# Patient Record
Sex: FEMALE | Race: WHITE | NOT HISPANIC OR LATINO | Employment: FULL TIME | URBAN - METROPOLITAN AREA
[De-identification: names, ages, dates, MRNs, and addresses within clinical notes are randomized per-mention and may not be internally consistent; named-entity substitution may affect disease eponyms.]

---

## 2021-09-11 ENCOUNTER — HOSPITAL ENCOUNTER (EMERGENCY)
Facility: HOSPITAL | Age: 26
Discharge: HOME | End: 2021-09-11
Attending: EMERGENCY MEDICINE | Admitting: EMERGENCY MEDICINE

## 2021-09-11 ENCOUNTER — APPOINTMENT (OUTPATIENT)
Dept: CT IMAGING | Facility: HOSPITAL | Age: 26
End: 2021-09-11

## 2021-09-11 VITALS
HEART RATE: 69 BPM | OXYGEN SATURATION: 98 % | RESPIRATION RATE: 16 BRPM | WEIGHT: 121.91 LBS | TEMPERATURE: 99.3 F | BODY MASS INDEX: 20.31 KG/M2 | HEIGHT: 65 IN | DIASTOLIC BLOOD PRESSURE: 62 MMHG | SYSTOLIC BLOOD PRESSURE: 107 MMHG

## 2021-09-11 DIAGNOSIS — N39.0 ACUTE UTI (URINARY TRACT INFECTION): ICD-10-CM

## 2021-09-11 DIAGNOSIS — S39.011A STRAIN OF ABDOMINAL MUSCLE, INITIAL ENCOUNTER: ICD-10-CM

## 2021-09-11 DIAGNOSIS — R10.30 LOWER ABDOMINAL PAIN: Primary | ICD-10-CM

## 2021-09-11 LAB
ALBUMIN SERPL-MCNC: 4 G/DL (ref 3.5–5.2)
ALBUMIN/GLOB SERPL: 1.2 G/DL
ALP SERPL-CCNC: 62 U/L (ref 39–117)
ALT SERPL W P-5'-P-CCNC: 7 U/L (ref 1–33)
ANION GAP SERPL CALCULATED.3IONS-SCNC: 8.1 MMOL/L (ref 5–15)
AST SERPL-CCNC: 13 U/L (ref 1–32)
BACTERIA UR QL AUTO: ABNORMAL /HPF
BASOPHILS # BLD AUTO: 0.04 10*3/MM3 (ref 0–0.2)
BASOPHILS NFR BLD AUTO: 0.5 % (ref 0–1.5)
BILIRUB SERPL-MCNC: 0.3 MG/DL (ref 0–1.2)
BILIRUB UR QL STRIP: NEGATIVE
BUN SERPL-MCNC: 7 MG/DL (ref 6–20)
BUN/CREAT SERPL: 9.2 (ref 7–25)
CALCIUM SPEC-SCNC: 9 MG/DL (ref 8.6–10.5)
CHLORIDE SERPL-SCNC: 102 MMOL/L (ref 98–107)
CLARITY UR: CLEAR
CO2 SERPL-SCNC: 25.9 MMOL/L (ref 22–29)
COLOR UR: YELLOW
CREAT SERPL-MCNC: 0.76 MG/DL (ref 0.57–1)
DEPRECATED RDW RBC AUTO: 42.1 FL (ref 37–54)
EOSINOPHIL # BLD AUTO: 0.09 10*3/MM3 (ref 0–0.4)
EOSINOPHIL NFR BLD AUTO: 1.1 % (ref 0.3–6.2)
ERYTHROCYTE [DISTWIDTH] IN BLOOD BY AUTOMATED COUNT: 12.4 % (ref 12.3–15.4)
GFR SERPL CREATININE-BSD FRML MDRD: 93 ML/MIN/1.73
GLOBULIN UR ELPH-MCNC: 3.3 GM/DL
GLUCOSE SERPL-MCNC: 105 MG/DL (ref 65–99)
GLUCOSE UR STRIP-MCNC: NEGATIVE MG/DL
HCG INTACT+B SERPL-ACNC: <0.5 MIU/ML
HCT VFR BLD AUTO: 34.1 % (ref 34–46.6)
HGB BLD-MCNC: 11.1 G/DL (ref 12–15.9)
HGB UR QL STRIP.AUTO: ABNORMAL
HOLD SPECIMEN: NORMAL
HOLD SPECIMEN: NORMAL
HYALINE CASTS UR QL AUTO: ABNORMAL /LPF
IMM GRANULOCYTES # BLD AUTO: 0.03 10*3/MM3 (ref 0–0.05)
IMM GRANULOCYTES NFR BLD AUTO: 0.4 % (ref 0–0.5)
KETONES UR QL STRIP: NEGATIVE
LEUKOCYTE ESTERASE UR QL STRIP.AUTO: ABNORMAL
LIPASE SERPL-CCNC: 24 U/L (ref 13–60)
LYMPHOCYTES # BLD AUTO: 0.82 10*3/MM3 (ref 0.7–3.1)
LYMPHOCYTES NFR BLD AUTO: 10.3 % (ref 19.6–45.3)
MCH RBC QN AUTO: 30.1 PG (ref 26.6–33)
MCHC RBC AUTO-ENTMCNC: 32.6 G/DL (ref 31.5–35.7)
MCV RBC AUTO: 92.4 FL (ref 79–97)
MONOCYTES # BLD AUTO: 0.92 10*3/MM3 (ref 0.1–0.9)
MONOCYTES NFR BLD AUTO: 11.6 % (ref 5–12)
NEUTROPHILS NFR BLD AUTO: 6.05 10*3/MM3 (ref 1.7–7)
NEUTROPHILS NFR BLD AUTO: 76.1 % (ref 42.7–76)
NITRITE UR QL STRIP: NEGATIVE
NRBC BLD AUTO-RTO: 0 /100 WBC (ref 0–0.2)
PH UR STRIP.AUTO: 5.5 [PH] (ref 5–8)
PLATELET # BLD AUTO: 249 10*3/MM3 (ref 140–450)
PMV BLD AUTO: 10.5 FL (ref 6–12)
POTASSIUM SERPL-SCNC: 4.5 MMOL/L (ref 3.5–5.2)
PROT SERPL-MCNC: 7.3 G/DL (ref 6–8.5)
PROT UR QL STRIP: NEGATIVE
RBC # BLD AUTO: 3.69 10*6/MM3 (ref 3.77–5.28)
RBC # UR: ABNORMAL /HPF
REF LAB TEST METHOD: ABNORMAL
SODIUM SERPL-SCNC: 136 MMOL/L (ref 136–145)
SP GR UR STRIP: 1.01 (ref 1–1.03)
SQUAMOUS #/AREA URNS HPF: ABNORMAL /HPF
UROBILINOGEN UR QL STRIP: ABNORMAL
WBC # BLD AUTO: 7.95 10*3/MM3 (ref 3.4–10.8)
WBC UR QL AUTO: ABNORMAL /HPF
WHOLE BLOOD HOLD SPECIMEN: NORMAL
WHOLE BLOOD HOLD SPECIMEN: NORMAL

## 2021-09-11 PROCEDURE — 80053 COMPREHEN METABOLIC PANEL: CPT | Performed by: EMERGENCY MEDICINE

## 2021-09-11 PROCEDURE — 83690 ASSAY OF LIPASE: CPT | Performed by: EMERGENCY MEDICINE

## 2021-09-11 PROCEDURE — 85025 COMPLETE CBC W/AUTO DIFF WBC: CPT | Performed by: EMERGENCY MEDICINE

## 2021-09-11 PROCEDURE — 36415 COLL VENOUS BLD VENIPUNCTURE: CPT | Performed by: EMERGENCY MEDICINE

## 2021-09-11 PROCEDURE — 84702 CHORIONIC GONADOTROPIN TEST: CPT | Performed by: EMERGENCY MEDICINE

## 2021-09-11 PROCEDURE — 96375 TX/PRO/DX INJ NEW DRUG ADDON: CPT

## 2021-09-11 PROCEDURE — 25010000002 ONDANSETRON PER 1 MG: Performed by: EMERGENCY MEDICINE

## 2021-09-11 PROCEDURE — 96374 THER/PROPH/DIAG INJ IV PUSH: CPT

## 2021-09-11 PROCEDURE — 25010000002 HYDROMORPHONE 1 MG/ML SOLUTION: Performed by: EMERGENCY MEDICINE

## 2021-09-11 PROCEDURE — 99283 EMERGENCY DEPT VISIT LOW MDM: CPT

## 2021-09-11 PROCEDURE — 0 IOPAMIDOL PER 1 ML: Performed by: EMERGENCY MEDICINE

## 2021-09-11 PROCEDURE — 81001 URINALYSIS AUTO W/SCOPE: CPT | Performed by: EMERGENCY MEDICINE

## 2021-09-11 PROCEDURE — 25010000002 MORPHINE PER 10 MG: Performed by: EMERGENCY MEDICINE

## 2021-09-11 PROCEDURE — 74177 CT ABD & PELVIS W/CONTRAST: CPT

## 2021-09-11 PROCEDURE — 99282 EMERGENCY DEPT VISIT SF MDM: CPT

## 2021-09-11 RX ORDER — SODIUM CHLORIDE 0.9 % (FLUSH) 0.9 %
10 SYRINGE (ML) INJECTION AS NEEDED
Status: DISCONTINUED | OUTPATIENT
Start: 2021-09-11 | End: 2021-09-11 | Stop reason: HOSPADM

## 2021-09-11 RX ORDER — KETOROLAC TROMETHAMINE 30 MG/ML
30 INJECTION, SOLUTION INTRAMUSCULAR; INTRAVENOUS ONCE
Status: DISCONTINUED | OUTPATIENT
Start: 2021-09-11 | End: 2021-09-11 | Stop reason: HOSPADM

## 2021-09-11 RX ORDER — KETOROLAC TROMETHAMINE 10 MG/1
10 TABLET, FILM COATED ORAL EVERY 6 HOURS PRN
Qty: 15 TABLET | Refills: 0 | Status: SHIPPED | OUTPATIENT
Start: 2021-09-11 | End: 2023-01-10

## 2021-09-11 RX ORDER — NITROFURANTOIN 25; 75 MG/1; MG/1
100 CAPSULE ORAL 2 TIMES DAILY
Qty: 14 CAPSULE | Refills: 0 | Status: SHIPPED | OUTPATIENT
Start: 2021-09-11 | End: 2023-01-10

## 2021-09-11 RX ORDER — ONDANSETRON 2 MG/ML
4 INJECTION INTRAMUSCULAR; INTRAVENOUS ONCE
Status: COMPLETED | OUTPATIENT
Start: 2021-09-11 | End: 2021-09-11

## 2021-09-11 RX ORDER — CYCLOBENZAPRINE HCL 10 MG
10 TABLET ORAL 3 TIMES DAILY
Qty: 20 TABLET | Refills: 0 | Status: SHIPPED | OUTPATIENT
Start: 2021-09-11 | End: 2023-01-10

## 2021-09-11 RX ORDER — RIZATRIPTAN BENZOATE 10 MG/1
TABLET, ORALLY DISINTEGRATING ORAL
COMMUNITY
Start: 2021-06-07 | End: 2023-01-10

## 2021-09-11 RX ADMIN — SODIUM CHLORIDE 1000 ML: 9 INJECTION, SOLUTION INTRAVENOUS at 11:41

## 2021-09-11 RX ADMIN — ONDANSETRON 4 MG: 2 INJECTION INTRAMUSCULAR; INTRAVENOUS at 11:42

## 2021-09-11 RX ADMIN — HYDROMORPHONE HYDROCHLORIDE 1 MG: 1 INJECTION, SOLUTION INTRAMUSCULAR; INTRAVENOUS; SUBCUTANEOUS at 11:42

## 2021-09-11 RX ADMIN — IOPAMIDOL 100 ML: 755 INJECTION, SOLUTION INTRAVENOUS at 14:24

## 2021-09-11 RX ADMIN — MORPHINE SULFATE 4 MG: 4 INJECTION, SOLUTION INTRAMUSCULAR; INTRAVENOUS at 13:37

## 2021-09-11 NOTE — ED PROVIDER NOTES
Subjective   Patient complaint of right lower quadrant abdominal pain worsening since last night.  Patient states pain has been constant and even noted mild swelling in region.  Patient denies any additional symptoms.      History provided by:  Patient and parent   used: No    Abdominal Pain  Pain location:  RLQ  Pain quality: sharp and stabbing    Pain radiates to:  Does not radiate  Pain severity:  Moderate  Onset quality:  Sudden  Duration: Since last night.  Timing:  Constant  Progression:  Worsening  Chronicity:  New  Context: not alcohol use, not awakening from sleep, not diet changes, not eating, not laxative use, not medication withdrawal, not previous surgeries, not recent illness, not recent sexual activity, not recent travel, not retching, not sick contacts, not suspicious food intake and not trauma    Relieved by:  Nothing  Worsened by:  Movement (Laying supine)  Associated symptoms: no anorexia, no belching, no chest pain, no chills, no cough, no diarrhea, no dysuria, no fatigue, no fever, no flatus, no hematemesis, no hematochezia, no hematuria, no melena, no nausea, no shortness of breath, no sore throat, no vaginal bleeding, no vaginal discharge and no vomiting    Risk factors: has not had multiple surgeries, not pregnant and no recent hospitalization        Review of Systems   Constitutional: Negative for chills, fatigue and fever.   HENT: Negative for congestion, ear pain and sore throat.    Eyes: Negative for pain.   Respiratory: Negative for cough, chest tightness and shortness of breath.    Cardiovascular: Negative for chest pain.   Gastrointestinal: Positive for abdominal pain. Negative for anorexia, diarrhea, flatus, hematemesis, hematochezia, melena, nausea and vomiting.        Patient complaint of right lower quadrant abdominal pain since last night.   Genitourinary: Negative for dysuria, flank pain, hematuria, vaginal bleeding and vaginal discharge.   Musculoskeletal:  Negative for joint swelling.   Skin: Negative for pallor.   Neurological: Negative for seizures and headaches.   All other systems reviewed and are negative.      Past Medical History:   Diagnosis Date   • Migraines        No Known Allergies    History reviewed. No pertinent surgical history.    History reviewed. No pertinent family history.    Social History     Socioeconomic History   • Marital status: Single     Spouse name: Not on file   • Number of children: Not on file   • Years of education: Not on file   • Highest education level: Not on file   Tobacco Use   • Smoking status: Current Some Day Smoker   • Smokeless tobacco: Never Used   • Tobacco comment: Electronic cigarette           Objective   Physical Exam  Vitals and nursing note reviewed.   Constitutional:       General: She is not in acute distress.     Appearance: Normal appearance. She is well-developed and normal weight. She is not toxic-appearing.   HENT:      Head: Normocephalic and atraumatic.      Mouth/Throat:      Mouth: Mucous membranes are moist.   Eyes:      Extraocular Movements: Extraocular movements intact.      Pupils: Pupils are equal, round, and reactive to light.   Cardiovascular:      Rate and Rhythm: Normal rate and regular rhythm.      Pulses: Normal pulses.      Heart sounds: Normal heart sounds.   Pulmonary:      Effort: Pulmonary effort is normal. No respiratory distress.      Breath sounds: Normal breath sounds.   Abdominal:      General: Abdomen is flat.      Palpations: Abdomen is soft.      Tenderness: There is abdominal tenderness in the right lower quadrant. There is guarding. There is no right CVA tenderness.   Musculoskeletal:         General: Normal range of motion.      Cervical back: Normal range of motion and neck supple.   Skin:     General: Skin is warm and dry.   Neurological:      Mental Status: She is alert and oriented to person, place, and time. Mental status is at baseline.         Procedures           ED  Course                                           MDM    Final diagnoses:   Lower abdominal pain   Strain of abdominal muscle, initial encounter   Acute UTI (urinary tract infection)       ED Disposition  ED Disposition     ED Disposition Condition Comment    Discharge Stable           Provider, No Known  [facility]  [place] [address]               Medication List      New Prescriptions    cyclobenzaprine 10 MG tablet  Commonly known as: FLEXERIL  Take 1 tablet by mouth 3 (Three) Times a Day.     ketorolac 10 MG tablet  Commonly known as: TORADOL  Take 1 tablet by mouth Every 6 (Six) Hours As Needed for Moderate Pain .     nitrofurantoin (macrocrystal-monohydrate) 100 MG capsule  Commonly known as: MACROBID  Take 1 capsule by mouth 2 (Two) Times a Day.           Where to Get Your Medications      These medications were sent to [id number] - [place] - [address] AT [facility] - [phone]  - [phone]   [address] [place] [address]    Hours: 24-hours Phone: [phone]   · cyclobenzaprine 10 MG tablet  · ketorolac 10 MG tablet  · nitrofurantoin (macrocrystal-monohydrate) 100 MG capsule          Arnoldo Flores, ANDREY  09/11/21 1792

## 2022-06-20 VITALS
BODY MASS INDEX: 21.21 KG/M2 | TEMPERATURE: 98.5 F | SYSTOLIC BLOOD PRESSURE: 95 MMHG | DIASTOLIC BLOOD PRESSURE: 50 MMHG | OXYGEN SATURATION: 100 % | HEART RATE: 82 BPM | HEIGHT: 66 IN | WEIGHT: 132 LBS | RESPIRATION RATE: 18 BRPM

## 2022-06-20 LAB
ALBUMIN SERPL-MCNC: 4.1 G/DL (ref 3.5–5.2)
ALBUMIN/GLOB SERPL: 1.5 G/DL
ALP SERPL-CCNC: 70 U/L (ref 39–117)
ALT SERPL W P-5'-P-CCNC: 15 U/L (ref 1–33)
ANION GAP SERPL CALCULATED.3IONS-SCNC: 9.3 MMOL/L (ref 5–15)
AST SERPL-CCNC: 18 U/L (ref 1–32)
BACTERIA UR QL AUTO: ABNORMAL /HPF
BASOPHILS # BLD AUTO: 0.04 10*3/MM3 (ref 0–0.2)
BASOPHILS NFR BLD AUTO: 0.3 % (ref 0–1.5)
BILIRUB SERPL-MCNC: 0.4 MG/DL (ref 0–1.2)
BILIRUB UR QL STRIP: NEGATIVE
BUN SERPL-MCNC: 9 MG/DL (ref 6–20)
BUN/CREAT SERPL: 12.9 (ref 7–25)
CALCIUM SPEC-SCNC: 9.2 MG/DL (ref 8.6–10.5)
CHLORIDE SERPL-SCNC: 99 MMOL/L (ref 98–107)
CLARITY UR: ABNORMAL
CO2 SERPL-SCNC: 29.7 MMOL/L (ref 22–29)
COLOR UR: ABNORMAL
CREAT SERPL-MCNC: 0.7 MG/DL (ref 0.57–1)
DEPRECATED RDW RBC AUTO: 44.6 FL (ref 37–54)
EGFRCR SERPLBLD CKD-EPI 2021: 123.3 ML/MIN/1.73
EOSINOPHIL # BLD AUTO: 0 10*3/MM3 (ref 0–0.4)
EOSINOPHIL NFR BLD AUTO: 0 % (ref 0.3–6.2)
ERYTHROCYTE [DISTWIDTH] IN BLOOD BY AUTOMATED COUNT: 13.2 % (ref 12.3–15.4)
GLOBULIN UR ELPH-MCNC: 2.8 GM/DL
GLUCOSE SERPL-MCNC: 113 MG/DL (ref 65–99)
GLUCOSE UR STRIP-MCNC: NEGATIVE MG/DL
HCG INTACT+B SERPL-ACNC: <0.5 MIU/ML
HCT VFR BLD AUTO: 34.4 % (ref 34–46.6)
HGB BLD-MCNC: 11.2 G/DL (ref 12–15.9)
HGB UR QL STRIP.AUTO: ABNORMAL
HOLD SPECIMEN: NORMAL
HOLD SPECIMEN: NORMAL
HYALINE CASTS UR QL AUTO: ABNORMAL /LPF
IMM GRANULOCYTES # BLD AUTO: 0.04 10*3/MM3 (ref 0–0.05)
IMM GRANULOCYTES NFR BLD AUTO: 0.3 % (ref 0–0.5)
KETONES UR QL STRIP: ABNORMAL
LEUKOCYTE ESTERASE UR QL STRIP.AUTO: ABNORMAL
LIPASE SERPL-CCNC: 15 U/L (ref 13–60)
LYMPHOCYTES # BLD AUTO: 0.77 10*3/MM3 (ref 0.7–3.1)
LYMPHOCYTES NFR BLD AUTO: 5.8 % (ref 19.6–45.3)
MCH RBC QN AUTO: 29.9 PG (ref 26.6–33)
MCHC RBC AUTO-ENTMCNC: 32.6 G/DL (ref 31.5–35.7)
MCV RBC AUTO: 91.7 FL (ref 79–97)
MONOCYTES # BLD AUTO: 0.85 10*3/MM3 (ref 0.1–0.9)
MONOCYTES NFR BLD AUTO: 6.4 % (ref 5–12)
NEUTROPHILS NFR BLD AUTO: 11.55 10*3/MM3 (ref 1.7–7)
NEUTROPHILS NFR BLD AUTO: 87.2 % (ref 42.7–76)
NITRITE UR QL STRIP: POSITIVE
NRBC BLD AUTO-RTO: 0 /100 WBC (ref 0–0.2)
PH UR STRIP.AUTO: 5.5 [PH] (ref 5–8)
PLATELET # BLD AUTO: 180 10*3/MM3 (ref 140–450)
PMV BLD AUTO: 10.8 FL (ref 6–12)
POTASSIUM SERPL-SCNC: 4.2 MMOL/L (ref 3.5–5.2)
PROT SERPL-MCNC: 6.9 G/DL (ref 6–8.5)
PROT UR QL STRIP: ABNORMAL
RBC # BLD AUTO: 3.75 10*6/MM3 (ref 3.77–5.28)
RBC # UR STRIP: ABNORMAL /HPF
REF LAB TEST METHOD: ABNORMAL
SODIUM SERPL-SCNC: 138 MMOL/L (ref 136–145)
SP GR UR STRIP: 1.02 (ref 1–1.03)
SQUAMOUS #/AREA URNS HPF: ABNORMAL /HPF
UROBILINOGEN UR QL STRIP: ABNORMAL
WBC # UR STRIP: ABNORMAL /HPF
WBC NRBC COR # BLD: 13.25 10*3/MM3 (ref 3.4–10.8)
WHOLE BLOOD HOLD COAG: NORMAL
WHOLE BLOOD HOLD SPECIMEN: NORMAL

## 2022-06-20 PROCEDURE — 36415 COLL VENOUS BLD VENIPUNCTURE: CPT | Performed by: EMERGENCY MEDICINE

## 2022-06-20 PROCEDURE — 96376 TX/PRO/DX INJ SAME DRUG ADON: CPT

## 2022-06-20 PROCEDURE — 99283 EMERGENCY DEPT VISIT LOW MDM: CPT

## 2022-06-20 PROCEDURE — 84702 CHORIONIC GONADOTROPIN TEST: CPT | Performed by: EMERGENCY MEDICINE

## 2022-06-20 PROCEDURE — 80053 COMPREHEN METABOLIC PANEL: CPT | Performed by: EMERGENCY MEDICINE

## 2022-06-20 PROCEDURE — 81001 URINALYSIS AUTO W/SCOPE: CPT | Performed by: EMERGENCY MEDICINE

## 2022-06-20 PROCEDURE — 85025 COMPLETE CBC W/AUTO DIFF WBC: CPT | Performed by: EMERGENCY MEDICINE

## 2022-06-20 PROCEDURE — 83690 ASSAY OF LIPASE: CPT | Performed by: EMERGENCY MEDICINE

## 2022-06-20 PROCEDURE — 96365 THER/PROPH/DIAG IV INF INIT: CPT

## 2022-06-20 PROCEDURE — 96375 TX/PRO/DX INJ NEW DRUG ADDON: CPT

## 2022-06-20 RX ORDER — SODIUM CHLORIDE 0.9 % (FLUSH) 0.9 %
10 SYRINGE (ML) INJECTION AS NEEDED
Status: DISCONTINUED | OUTPATIENT
Start: 2022-06-20 | End: 2022-06-21 | Stop reason: HOSPADM

## 2022-06-21 ENCOUNTER — APPOINTMENT (OUTPATIENT)
Dept: CT IMAGING | Facility: HOSPITAL | Age: 27
End: 2022-06-21

## 2022-06-21 ENCOUNTER — HOSPITAL ENCOUNTER (EMERGENCY)
Facility: HOSPITAL | Age: 27
Discharge: HOME | End: 2022-06-21
Attending: EMERGENCY MEDICINE | Admitting: EMERGENCY MEDICINE

## 2022-06-21 DIAGNOSIS — U07.1 PNEUMONIA DUE TO COVID-19 VIRUS: Primary | ICD-10-CM

## 2022-06-21 DIAGNOSIS — J90 PLEURAL EFFUSION ON RIGHT: ICD-10-CM

## 2022-06-21 DIAGNOSIS — J12.82 PNEUMONIA DUE TO COVID-19 VIRUS: Primary | ICD-10-CM

## 2022-06-21 DIAGNOSIS — N30.01 ACUTE CYSTITIS WITH HEMATURIA: ICD-10-CM

## 2022-06-21 LAB
D-LACTATE SERPL-SCNC: 1.3 MMOL/L (ref 0.5–2)
SARS-COV-2 RNA PNL SPEC NAA+PROBE: NOT DETECTED

## 2022-06-21 PROCEDURE — 25010000002 CEFTRIAXONE PER 250 MG: Performed by: NURSE PRACTITIONER

## 2022-06-21 PROCEDURE — 96375 TX/PRO/DX INJ NEW DRUG ADDON: CPT

## 2022-06-21 PROCEDURE — 0 IOPAMIDOL PER 1 ML: Performed by: EMERGENCY MEDICINE

## 2022-06-21 PROCEDURE — 25010000002 KETOROLAC TROMETHAMINE PER 15 MG: Performed by: NURSE PRACTITIONER

## 2022-06-21 PROCEDURE — 25010000002 DEXAMETHASONE PER 1 MG: Performed by: NURSE PRACTITIONER

## 2022-06-21 PROCEDURE — C9803 HOPD COVID-19 SPEC COLLECT: HCPCS | Performed by: NURSE PRACTITIONER

## 2022-06-21 PROCEDURE — 87040 BLOOD CULTURE FOR BACTERIA: CPT | Performed by: NURSE PRACTITIONER

## 2022-06-21 PROCEDURE — 83605 ASSAY OF LACTIC ACID: CPT | Performed by: NURSE PRACTITIONER

## 2022-06-21 PROCEDURE — 74177 CT ABD & PELVIS W/CONTRAST: CPT

## 2022-06-21 PROCEDURE — 25010000002 ONDANSETRON PER 1 MG: Performed by: NURSE PRACTITIONER

## 2022-06-21 PROCEDURE — 36415 COLL VENOUS BLD VENIPUNCTURE: CPT

## 2022-06-21 PROCEDURE — U0004 COV-19 TEST NON-CDC HGH THRU: HCPCS | Performed by: NURSE PRACTITIONER

## 2022-06-21 PROCEDURE — 96365 THER/PROPH/DIAG IV INF INIT: CPT

## 2022-06-21 PROCEDURE — 96376 TX/PRO/DX INJ SAME DRUG ADON: CPT

## 2022-06-21 RX ORDER — SODIUM CHLORIDE 0.9 % (FLUSH) 0.9 %
10 SYRINGE (ML) INJECTION AS NEEDED
Status: DISCONTINUED | OUTPATIENT
Start: 2022-06-21 | End: 2022-06-21 | Stop reason: HOSPADM

## 2022-06-21 RX ORDER — DEXAMETHASONE SODIUM PHOSPHATE 10 MG/ML
8 INJECTION INTRAMUSCULAR; INTRAVENOUS ONCE
Status: COMPLETED | OUTPATIENT
Start: 2022-06-21 | End: 2022-06-21

## 2022-06-21 RX ORDER — CIPROFLOXACIN 500 MG/1
500 TABLET, FILM COATED ORAL 2 TIMES DAILY
Qty: 14 TABLET | Refills: 0 | Status: SHIPPED | OUTPATIENT
Start: 2022-06-21 | End: 2023-01-10

## 2022-06-21 RX ORDER — ONDANSETRON 2 MG/ML
4 INJECTION INTRAMUSCULAR; INTRAVENOUS ONCE
Status: COMPLETED | OUTPATIENT
Start: 2022-06-21 | End: 2022-06-21

## 2022-06-21 RX ORDER — ALBUTEROL SULFATE 90 UG/1
2 AEROSOL, METERED RESPIRATORY (INHALATION) EVERY 4 HOURS PRN
Qty: 6.7 G | Refills: 0 | Status: SHIPPED | OUTPATIENT
Start: 2022-06-21

## 2022-06-21 RX ORDER — KETOROLAC TROMETHAMINE 30 MG/ML
30 INJECTION, SOLUTION INTRAMUSCULAR; INTRAVENOUS ONCE
Status: COMPLETED | OUTPATIENT
Start: 2022-06-21 | End: 2022-06-21

## 2022-06-21 RX ORDER — ONDANSETRON 4 MG/1
4 TABLET, ORALLY DISINTEGRATING ORAL 4 TIMES DAILY PRN
Qty: 15 TABLET | Refills: 0 | Status: SHIPPED | OUTPATIENT
Start: 2022-06-21 | End: 2023-01-10

## 2022-06-21 RX ORDER — CEFTRIAXONE SODIUM 1 G/50ML
1 INJECTION, SOLUTION INTRAVENOUS ONCE
Status: COMPLETED | OUTPATIENT
Start: 2022-06-21 | End: 2022-06-21

## 2022-06-21 RX ADMIN — KETOROLAC TROMETHAMINE 30 MG: 30 INJECTION, SOLUTION INTRAMUSCULAR; INTRAVENOUS at 01:41

## 2022-06-21 RX ADMIN — SODIUM CHLORIDE 1000 ML: 9 INJECTION, SOLUTION INTRAVENOUS at 01:40

## 2022-06-21 RX ADMIN — ONDANSETRON 4 MG: 2 INJECTION INTRAMUSCULAR; INTRAVENOUS at 01:41

## 2022-06-21 RX ADMIN — CEFTRIAXONE SODIUM 1 G: 1 INJECTION, SOLUTION INTRAVENOUS at 03:34

## 2022-06-21 RX ADMIN — ONDANSETRON 4 MG: 2 INJECTION INTRAMUSCULAR; INTRAVENOUS at 03:35

## 2022-06-21 RX ADMIN — IOPAMIDOL 100 ML: 755 INJECTION, SOLUTION INTRAVENOUS at 02:24

## 2022-06-21 RX ADMIN — DEXAMETHASONE SODIUM PHOSPHATE 8 MG: 10 INJECTION, SOLUTION INTRAMUSCULAR; INTRAVENOUS at 03:34

## 2022-06-21 NOTE — DISCHARGE INSTRUCTIONS
Rest, drink plenty of fluids.  Take your meds as prescribed.  Complete your antibiotics.  Continue with the over-the-counter acetaminophen and Motrin as needed for aches pains and fever.  Call and schedule follow-up visit with your primary care provider in [place] next week for further evaluation and treatment.  Return to the emergency department for any acutely developing respiratory distress, any persistent vomiting, any worsening abdominal pain or any new or worse concerns.

## 2022-06-21 NOTE — ED PROVIDER NOTES
Subjective   The patient presents to the emergency department and states that last Wednesday she had a temperature of 102.3.  She states that she slept for 2 days with intermittent fevers and felt terrible.  She states that she has had COVID before and would have sworn she had COVID due to her symptoms.  She states that Friday and Saturday she was feeling much better but then states Sunday it all came back again only worse.  She states that she has had nausea and vomiting today.  She reports that she got nasal congestion and a cough.  She reports right lateral abdominal pain and right flank pain.  She states that she has had some dysuria and increase in frequency.  Her abdomen is soft an basically nontender with palpation.  She does have some CVA tenderness in her lower right flank.  Her breath sounds are clear.  She is in no respiratory distress on exam.  She speaks in full sentences.  Her airway is patent.          Review of Systems   Constitutional: Positive for chills, fatigue and fever.   HENT: Positive for congestion and rhinorrhea. Negative for ear pain, sore throat, trouble swallowing and voice change.    Eyes: Negative for pain.   Respiratory: Positive for cough. Negative for chest tightness, shortness of breath and wheezing.    Cardiovascular: Negative for chest pain and leg swelling.   Gastrointestinal: Positive for nausea and vomiting. Negative for abdominal pain and diarrhea.   Genitourinary: Positive for dysuria and flank pain. Negative for frequency, hematuria, vaginal bleeding and vaginal discharge.   Musculoskeletal: Positive for back pain. Negative for joint swelling, neck pain and neck stiffness.   Skin: Negative for pallor and rash.   Neurological: Negative for seizures and headaches.   All other systems reviewed and are negative.      Past Medical History:   Diagnosis Date   • Migraines        No Known Allergies    History reviewed. No pertinent surgical history.    History reviewed. No  pertinent family history.    Social History     Socioeconomic History   • Marital status: Single   Tobacco Use   • Smoking status: Current Some Day Smoker   • Smokeless tobacco: Never Used   • Tobacco comment: Electronic cigarette   Vaping Use   • Vaping Use: Every day   Substance and Sexual Activity   • Alcohol use: Yes     Comment: OCCASIONAL   • Drug use: Never           Objective   Physical Exam  Vitals and nursing note reviewed.   Constitutional:       General: She is not in acute distress.     Appearance: Normal appearance. She is not toxic-appearing.   HENT:      Head: Normocephalic and atraumatic.   Eyes:      General: No scleral icterus.  Cardiovascular:      Rate and Rhythm: Normal rate and regular rhythm.      Pulses: Normal pulses.   Pulmonary:      Effort: Pulmonary effort is normal. No respiratory distress.      Breath sounds: Normal breath sounds.   Abdominal:      General: Abdomen is flat.      Palpations: Abdomen is soft.      Tenderness: There is no abdominal tenderness. There is no guarding or rebound.   Musculoskeletal:         General: Normal range of motion.      Cervical back: Normal range of motion and neck supple. No rigidity or tenderness.   Lymphadenopathy:      Cervical: No cervical adenopathy.   Skin:     General: Skin is warm and dry.      Capillary Refill: Capillary refill takes less than 2 seconds.      Findings: No rash.   Neurological:      General: No focal deficit present.      Mental Status: She is alert and oriented to person, place, and time. Mental status is at baseline.   Psychiatric:         Mood and Affect: Mood normal.         Behavior: Behavior normal.         Procedures           ED Course  ED Course as of 06/21/22 0723   Tue Jun 21, 2022   5434 I reviewed the patient's test results with her.  We discussed the need for her to complete her antibiotics and to follow-up with her family doctor in [place].  She verbalized understanding of follow-up and return instructions  to the ED. [TC]      ED Course User Index  [TC] Shanice Espinal APRN                                                 MDM  Number of Diagnoses or Management Options  Acute cystitis with hematuria: new and requires workup  Pleural effusion on right: new and requires workup  Pneumonia due to COVID-19 virus: new and requires workup     Amount and/or Complexity of Data Reviewed  Clinical lab tests: reviewed  Tests in the radiology section of CPT®: reviewed    Risk of Complications, Morbidity, and/or Mortality  Presenting problems: low  Diagnostic procedures: low  Management options: low    Patient Progress  Patient progress: stable      Final diagnoses:   Pneumonia due to COVID-19 virus   Acute cystitis with hematuria   Pleural effusion on right       ED Disposition  ED Disposition     ED Disposition   Discharge    Condition   Stable    Comment   --             YOUR PCP IN [place]    Call   FOR FOLLOW UP         Medication List      New Prescriptions    albuterol sulfate  (90 Base) MCG/ACT inhaler  Commonly known as: PROVENTIL HFA;VENTOLIN HFA;PROAIR HFA  Inhale 2 puffs Every 4 (Four) Hours As Needed for Wheezing.     ciprofloxacin 500 MG tablet  Commonly known as: CIPRO  Take 1 tablet by mouth 2 (Two) Times a Day.     ondansetron ODT 4 MG disintegrating tablet  Commonly known as: ZOFRAN-ODT  Place 1 tablet on the tongue 4 (Four) Times a Day As Needed for Nausea or Vomiting.           Where to Get Your Medications      These medications were sent to [facility] #[id number] - [place] - [address] AT [facility] - [phone] [facility] [phone] FX  [address] [place] [address]    Hours: 24-hours Phone: [phone]   · albuterol sulfate  (90 Base) MCG/ACT inhaler  · ciprofloxacin 500 MG tablet  · ondansetron ODT 4 MG disintegrating tablet          Shanice Espinal APRN  06/21/22 3606

## 2022-06-26 LAB
BACTERIA SPEC AEROBE CULT: NORMAL
BACTERIA SPEC AEROBE CULT: NORMAL

## 2022-07-26 ENCOUNTER — APPOINTMENT (OUTPATIENT)
Dept: GENERAL RADIOLOGY | Facility: HOSPITAL | Age: 27
End: 2022-07-26

## 2022-07-26 ENCOUNTER — HOSPITAL ENCOUNTER (EMERGENCY)
Facility: HOSPITAL | Age: 27
Discharge: HOME | End: 2022-07-26
Attending: EMERGENCY MEDICINE | Admitting: EMERGENCY MEDICINE

## 2022-07-26 VITALS
DIASTOLIC BLOOD PRESSURE: 75 MMHG | WEIGHT: 135.36 LBS | TEMPERATURE: 98.4 F | OXYGEN SATURATION: 100 % | SYSTOLIC BLOOD PRESSURE: 121 MMHG | RESPIRATION RATE: 22 BRPM | HEART RATE: 78 BPM | BODY MASS INDEX: 22.55 KG/M2 | HEIGHT: 65 IN

## 2022-07-26 DIAGNOSIS — K52.9 ACUTE GASTROENTERITIS: Primary | ICD-10-CM

## 2022-07-26 LAB
ALBUMIN SERPL-MCNC: 4.9 G/DL (ref 3.5–5.2)
ALBUMIN/GLOB SERPL: 1.6 G/DL
ALP SERPL-CCNC: 71 U/L (ref 39–117)
ALT SERPL W P-5'-P-CCNC: 55 U/L (ref 1–33)
ANION GAP SERPL CALCULATED.3IONS-SCNC: 11.6 MMOL/L (ref 5–15)
AST SERPL-CCNC: 46 U/L (ref 1–32)
BASOPHILS # BLD AUTO: 0.02 10*3/MM3 (ref 0–0.2)
BASOPHILS NFR BLD AUTO: 0.3 % (ref 0–1.5)
BILIRUB SERPL-MCNC: 0.5 MG/DL (ref 0–1.2)
BUN SERPL-MCNC: 11 MG/DL (ref 6–20)
BUN/CREAT SERPL: 14.1 (ref 7–25)
CALCIUM SPEC-SCNC: 9.9 MG/DL (ref 8.6–10.5)
CHLORIDE SERPL-SCNC: 100 MMOL/L (ref 98–107)
CK MB SERPL-CCNC: 1.12 NG/ML
CK SERPL-CCNC: 107 U/L (ref 20–180)
CO2 SERPL-SCNC: 26.4 MMOL/L (ref 22–29)
CREAT SERPL-MCNC: 0.78 MG/DL (ref 0.57–1)
DEPRECATED RDW RBC AUTO: 43.2 FL (ref 37–54)
EGFRCR SERPLBLD CKD-EPI 2021: 108.3 ML/MIN/1.73
EOSINOPHIL # BLD AUTO: 0 10*3/MM3 (ref 0–0.4)
EOSINOPHIL NFR BLD AUTO: 0 % (ref 0.3–6.2)
ERYTHROCYTE [DISTWIDTH] IN BLOOD BY AUTOMATED COUNT: 13.1 % (ref 12.3–15.4)
GLOBULIN UR ELPH-MCNC: 3 GM/DL
GLUCOSE SERPL-MCNC: 112 MG/DL (ref 65–99)
HCG INTACT+B SERPL-ACNC: <0.5 MIU/ML
HCT VFR BLD AUTO: 38.1 % (ref 34–46.6)
HGB BLD-MCNC: 12.5 G/DL (ref 12–15.9)
HOLD SPECIMEN: NORMAL
IMM GRANULOCYTES # BLD AUTO: 0.02 10*3/MM3 (ref 0–0.05)
IMM GRANULOCYTES NFR BLD AUTO: 0.3 % (ref 0–0.5)
LIPASE SERPL-CCNC: 30 U/L (ref 13–60)
LYMPHOCYTES # BLD AUTO: 0.88 10*3/MM3 (ref 0.7–3.1)
LYMPHOCYTES NFR BLD AUTO: 13.2 % (ref 19.6–45.3)
MAGNESIUM SERPL-MCNC: 2 MG/DL (ref 1.6–2.6)
MCH RBC QN AUTO: 29.3 PG (ref 26.6–33)
MCHC RBC AUTO-ENTMCNC: 32.8 G/DL (ref 31.5–35.7)
MCV RBC AUTO: 89.2 FL (ref 79–97)
MONOCYTES # BLD AUTO: 0.27 10*3/MM3 (ref 0.1–0.9)
MONOCYTES NFR BLD AUTO: 4 % (ref 5–12)
NEUTROPHILS NFR BLD AUTO: 5.48 10*3/MM3 (ref 1.7–7)
NEUTROPHILS NFR BLD AUTO: 82.2 % (ref 42.7–76)
NRBC BLD AUTO-RTO: 0 /100 WBC (ref 0–0.2)
NT-PROBNP SERPL-MCNC: 226.2 PG/ML (ref 0–450)
PLATELET # BLD AUTO: 224 10*3/MM3 (ref 140–450)
PMV BLD AUTO: 10.5 FL (ref 6–12)
POTASSIUM SERPL-SCNC: 4 MMOL/L (ref 3.5–5.2)
PROT SERPL-MCNC: 7.9 G/DL (ref 6–8.5)
RBC # BLD AUTO: 4.27 10*6/MM3 (ref 3.77–5.28)
SODIUM SERPL-SCNC: 138 MMOL/L (ref 136–145)
TROPONIN I SERPL-MCNC: 0.02 NG/ML (ref 0–0.08)
WBC NRBC COR # BLD: 6.67 10*3/MM3 (ref 3.4–10.8)
WHOLE BLOOD HOLD COAG: NORMAL
WHOLE BLOOD HOLD SPECIMEN: NORMAL

## 2022-07-26 PROCEDURE — 99283 EMERGENCY DEPT VISIT LOW MDM: CPT

## 2022-07-26 PROCEDURE — 71045 X-RAY EXAM CHEST 1 VIEW: CPT

## 2022-07-26 PROCEDURE — 83690 ASSAY OF LIPASE: CPT | Performed by: EMERGENCY MEDICINE

## 2022-07-26 PROCEDURE — 82553 CREATINE MB FRACTION: CPT | Performed by: EMERGENCY MEDICINE

## 2022-07-26 PROCEDURE — 80053 COMPREHEN METABOLIC PANEL: CPT | Performed by: EMERGENCY MEDICINE

## 2022-07-26 PROCEDURE — 93005 ELECTROCARDIOGRAM TRACING: CPT | Performed by: EMERGENCY MEDICINE

## 2022-07-26 PROCEDURE — 25010000002 ONDANSETRON PER 1 MG: Performed by: EMERGENCY MEDICINE

## 2022-07-26 PROCEDURE — 36415 COLL VENOUS BLD VENIPUNCTURE: CPT

## 2022-07-26 PROCEDURE — 25010000002 KETOROLAC TROMETHAMINE PER 15 MG: Performed by: EMERGENCY MEDICINE

## 2022-07-26 PROCEDURE — 96375 TX/PRO/DX INJ NEW DRUG ADDON: CPT

## 2022-07-26 PROCEDURE — 96374 THER/PROPH/DIAG INJ IV PUSH: CPT

## 2022-07-26 PROCEDURE — 83880 ASSAY OF NATRIURETIC PEPTIDE: CPT | Performed by: EMERGENCY MEDICINE

## 2022-07-26 PROCEDURE — 84484 ASSAY OF TROPONIN QUANT: CPT

## 2022-07-26 PROCEDURE — 82550 ASSAY OF CK (CPK): CPT | Performed by: EMERGENCY MEDICINE

## 2022-07-26 PROCEDURE — 93005 ELECTROCARDIOGRAM TRACING: CPT

## 2022-07-26 PROCEDURE — 84702 CHORIONIC GONADOTROPIN TEST: CPT | Performed by: EMERGENCY MEDICINE

## 2022-07-26 PROCEDURE — 93010 ELECTROCARDIOGRAM REPORT: CPT | Performed by: INTERNAL MEDICINE

## 2022-07-26 PROCEDURE — 83735 ASSAY OF MAGNESIUM: CPT | Performed by: EMERGENCY MEDICINE

## 2022-07-26 PROCEDURE — 85025 COMPLETE CBC W/AUTO DIFF WBC: CPT

## 2022-07-26 RX ORDER — SODIUM CHLORIDE 0.9 % (FLUSH) 0.9 %
10 SYRINGE (ML) INJECTION AS NEEDED
Status: DISCONTINUED | OUTPATIENT
Start: 2022-07-26 | End: 2022-07-27 | Stop reason: HOSPADM

## 2022-07-26 RX ORDER — KETOROLAC TROMETHAMINE 30 MG/ML
30 INJECTION, SOLUTION INTRAMUSCULAR; INTRAVENOUS ONCE
Status: COMPLETED | OUTPATIENT
Start: 2022-07-26 | End: 2022-07-26

## 2022-07-26 RX ORDER — ONDANSETRON 2 MG/ML
4 INJECTION INTRAMUSCULAR; INTRAVENOUS ONCE
Status: COMPLETED | OUTPATIENT
Start: 2022-07-26 | End: 2022-07-26

## 2022-07-26 RX ORDER — ASPIRIN 81 MG/1
324 TABLET, CHEWABLE ORAL ONCE
Status: COMPLETED | OUTPATIENT
Start: 2022-07-26 | End: 2022-07-26

## 2022-07-26 RX ORDER — FAMOTIDINE 10 MG/ML
20 INJECTION, SOLUTION INTRAVENOUS ONCE
Status: COMPLETED | OUTPATIENT
Start: 2022-07-26 | End: 2022-07-26

## 2022-07-26 RX ADMIN — FAMOTIDINE 20 MG: 10 INJECTION INTRAVENOUS at 21:39

## 2022-07-26 RX ADMIN — ASPIRIN 81 MG CHEWABLE TABLET 324 MG: 81 TABLET CHEWABLE at 20:32

## 2022-07-26 RX ADMIN — KETOROLAC TROMETHAMINE 30 MG: 30 INJECTION, SOLUTION INTRAMUSCULAR; INTRAVENOUS at 21:39

## 2022-07-26 RX ADMIN — ONDANSETRON 4 MG: 2 INJECTION INTRAMUSCULAR; INTRAVENOUS at 21:39

## 2022-07-26 RX ADMIN — SODIUM CHLORIDE 1000 ML: 9 INJECTION, SOLUTION INTRAVENOUS at 21:39

## 2022-07-27 LAB — QT INTERVAL: 397 MS

## 2023-05-03 ENCOUNTER — APPOINTMENT (OUTPATIENT)
Dept: GENERAL RADIOLOGY | Facility: HOSPITAL | Age: 28
End: 2023-05-03
Payer: COMMERCIAL

## 2023-05-03 VITALS
RESPIRATION RATE: 18 BRPM | OXYGEN SATURATION: 96 % | SYSTOLIC BLOOD PRESSURE: 130 MMHG | WEIGHT: 169.75 LBS | HEART RATE: 86 BPM | TEMPERATURE: 98.5 F | HEIGHT: 65 IN | BODY MASS INDEX: 28.28 KG/M2 | DIASTOLIC BLOOD PRESSURE: 86 MMHG

## 2023-05-03 LAB
ALBUMIN SERPL-MCNC: 4.8 G/DL (ref 3.5–5.2)
ALBUMIN/GLOB SERPL: 1.5 G/DL
ALP SERPL-CCNC: 88 U/L (ref 39–117)
ALT SERPL W P-5'-P-CCNC: 38 U/L (ref 1–33)
ANION GAP SERPL CALCULATED.3IONS-SCNC: 10.7 MMOL/L (ref 5–15)
AST SERPL-CCNC: 29 U/L (ref 1–32)
BASOPHILS # BLD AUTO: 0.05 10*3/MM3 (ref 0–0.2)
BASOPHILS NFR BLD AUTO: 0.8 % (ref 0–1.5)
BILIRUB SERPL-MCNC: 0.4 MG/DL (ref 0–1.2)
BUN SERPL-MCNC: 11 MG/DL (ref 6–20)
BUN/CREAT SERPL: 14.7 (ref 7–25)
CALCIUM SPEC-SCNC: 9.7 MG/DL (ref 8.6–10.5)
CHLORIDE SERPL-SCNC: 103 MMOL/L (ref 98–107)
CO2 SERPL-SCNC: 25.3 MMOL/L (ref 22–29)
CREAT SERPL-MCNC: 0.75 MG/DL (ref 0.57–1)
DEPRECATED RDW RBC AUTO: 38.2 FL (ref 37–54)
EGFRCR SERPLBLD CKD-EPI 2021: 112.8 ML/MIN/1.73
EOSINOPHIL # BLD AUTO: 0.01 10*3/MM3 (ref 0–0.4)
EOSINOPHIL NFR BLD AUTO: 0.2 % (ref 0.3–6.2)
ERYTHROCYTE [DISTWIDTH] IN BLOOD BY AUTOMATED COUNT: 12.1 % (ref 12.3–15.4)
FLUAV AG NPH QL: NEGATIVE
FLUBV AG NPH QL IA: NEGATIVE
GLOBULIN UR ELPH-MCNC: 3.1 GM/DL
GLUCOSE SERPL-MCNC: 119 MG/DL (ref 65–99)
HCG INTACT+B SERPL-ACNC: <0.5 MIU/ML
HCT VFR BLD AUTO: 38.7 % (ref 34–46.6)
HGB BLD-MCNC: 13.1 G/DL (ref 12–15.9)
HOLD SPECIMEN: NORMAL
HOLD SPECIMEN: NORMAL
IMM GRANULOCYTES # BLD AUTO: 0.02 10*3/MM3 (ref 0–0.05)
IMM GRANULOCYTES NFR BLD AUTO: 0.3 % (ref 0–0.5)
LIPASE SERPL-CCNC: 30 U/L (ref 13–60)
LYMPHOCYTES # BLD AUTO: 1.1 10*3/MM3 (ref 0.7–3.1)
LYMPHOCYTES NFR BLD AUTO: 16.9 % (ref 19.6–45.3)
MCH RBC QN AUTO: 29.1 PG (ref 26.6–33)
MCHC RBC AUTO-ENTMCNC: 33.9 G/DL (ref 31.5–35.7)
MCV RBC AUTO: 86 FL (ref 79–97)
MONOCYTES # BLD AUTO: 0.29 10*3/MM3 (ref 0.1–0.9)
MONOCYTES NFR BLD AUTO: 4.5 % (ref 5–12)
NEUTROPHILS NFR BLD AUTO: 5.03 10*3/MM3 (ref 1.7–7)
NEUTROPHILS NFR BLD AUTO: 77.3 % (ref 42.7–76)
NRBC BLD AUTO-RTO: 0 /100 WBC (ref 0–0.2)
PLATELET # BLD AUTO: 275 10*3/MM3 (ref 140–450)
PMV BLD AUTO: 10.4 FL (ref 6–12)
POTASSIUM SERPL-SCNC: 4 MMOL/L (ref 3.5–5.2)
PROT SERPL-MCNC: 7.9 G/DL (ref 6–8.5)
RBC # BLD AUTO: 4.5 10*6/MM3 (ref 3.77–5.28)
SODIUM SERPL-SCNC: 139 MMOL/L (ref 136–145)
WBC NRBC COR # BLD: 6.5 10*3/MM3 (ref 3.4–10.8)
WHOLE BLOOD HOLD COAG: NORMAL
WHOLE BLOOD HOLD SPECIMEN: NORMAL

## 2023-05-03 PROCEDURE — 85025 COMPLETE CBC W/AUTO DIFF WBC: CPT

## 2023-05-03 PROCEDURE — 99282 EMERGENCY DEPT VISIT SF MDM: CPT

## 2023-05-03 PROCEDURE — 96374 THER/PROPH/DIAG INJ IV PUSH: CPT

## 2023-05-03 PROCEDURE — 74019 RADEX ABDOMEN 2 VIEWS: CPT

## 2023-05-03 PROCEDURE — 83690 ASSAY OF LIPASE: CPT

## 2023-05-03 PROCEDURE — 87804 INFLUENZA ASSAY W/OPTIC: CPT

## 2023-05-03 PROCEDURE — 84702 CHORIONIC GONADOTROPIN TEST: CPT

## 2023-05-03 PROCEDURE — C9803 HOPD COVID-19 SPEC COLLECT: HCPCS

## 2023-05-03 PROCEDURE — 80053 COMPREHEN METABOLIC PANEL: CPT

## 2023-05-03 PROCEDURE — 36415 COLL VENOUS BLD VENIPUNCTURE: CPT

## 2023-05-03 PROCEDURE — 96375 TX/PRO/DX INJ NEW DRUG ADDON: CPT

## 2023-05-03 PROCEDURE — U0004 COV-19 TEST NON-CDC HGH THRU: HCPCS

## 2023-05-03 RX ORDER — SODIUM CHLORIDE 0.9 % (FLUSH) 0.9 %
10 SYRINGE (ML) INJECTION AS NEEDED
Status: DISCONTINUED | OUTPATIENT
Start: 2023-05-03 | End: 2023-05-04 | Stop reason: HOSPADM

## 2023-05-03 NOTE — ED PROVIDER NOTES
"Time: 6:24 PM EDT  Date of encounter:  5/3/2023  Independent Historian/Clinical History and Information was obtained by:   Patient  Chief Complaint: Headache, constipation shortness of breath    History is limited by: N/A    History of Present Illness:  Patient is a 28 y.o. year old female who presents to the emergency department for evaluation of headache, constipation, shortness of breath and vomiting that started yesterday.    HPI    Patient Care Team  Primary Care Provider: Karen Fox APRN    Past Medical History:     No Known Allergies  Past Medical History:   Diagnosis Date   • Migraines      No past surgical history on file.  No family history on file.    Home Medications:  Prior to Admission medications    Medication Sig Start Date End Date Taking? Authorizing Provider   albuterol sulfate  (90 Base) MCG/ACT inhaler Inhale 2 puffs Every 4 (Four) Hours As Needed for Wheezing. 6/21/22   Shanice Espinal APRN        Social History:   Social History     Tobacco Use   • Smoking status: Some Days   • Smokeless tobacco: Never   • Tobacco comments:     Electronic cigarette   Vaping Use   • Vaping Use: Every day   Substance Use Topics   • Alcohol use: Yes     Comment: OCCASIONAL   • Drug use: Never         Review of Systems:  Review of Systems   Constitutional: Positive for chills.   HENT: Positive for congestion.    Respiratory: Positive for shortness of breath.    Neurological: Positive for headaches.        Physical Exam:  /86   Pulse 86   Temp 98.5 °F (36.9 °C) (Oral)   Resp 18   Ht 165.1 cm (65\")   Wt 77 kg (169 lb 12.1 oz)   SpO2 96%   BMI 28.25 kg/m²     Physical Exam  HENT:      Head: Normocephalic.      Mouth/Throat:      Mouth: Mucous membranes are moist.   Eyes:      Pupils: Pupils are equal, round, and reactive to light.   Pulmonary:      Effort: Pulmonary effort is normal.   Abdominal:      General: There is no distension.   Musculoskeletal:      Cervical back: Neck supple. "   Skin:     General: Skin is warm and dry.   Neurological:      General: No focal deficit present.      Mental Status: She is alert and oriented to person, place, and time.   Psychiatric:         Mood and Affect: Mood normal.         Behavior: Behavior normal.                  Procedures:  Procedures      Medical Decision Making:      Comorbidities that affect care:    {Comorbidities that affect care:96304}    External Notes reviewed:    {External Note review (Optional):97404}      The following orders were placed and all results were independently analyzed by me:  Orders Placed This Encounter   Procedures   • COVID-19,APTIMA PANTHER(NILES),[facility], NP/OP SWAB IN UTM/VTM/SALINE TRANSPORT MEDIA,24 HR TAT - Swab, Nasopharynx   • [facility] Draw   • Comprehensive Metabolic Panel   • Lipase   • Urinalysis With Microscopic If Indicated (No Culture) - Urine, Clean Catch   • hCG, Quantitative, Pregnancy   • CBC Auto Differential   • NPO Diet NPO Type: Strict NPO   • Undress & Gown   • Insert Peripheral IV   • CBC & Differential   • Green Top (Gel)   • Lavender Top   • Gold Top - SST   • Light Blue Top       Medications Given in the Emergency Department:  Medications   sodium chloride 0.9 % flush 10 mL (has no administration in time range)        ED Course:         Labs:    Lab Results (last 24 hours)     ** No results found for the last 24 hours. **           Imaging:    No Radiology Exams Resulted Within Past 24 Hours      Differential Diagnosis and Discussion:    {Differentials:22404}    {Independent Review of (Optional):24775}    [facility]     {Critical Care:96324}    Patient Care Considerations:    {Considerations (Optional):59015}      Consultants/Shared Management Plan:    {Shared Management Plan (Optional):03302}    Social Determinants of Health:    {Social Determinants of Health (Optional):05352}      Disposition and Care Coordination:    {Admission consideration:39722}    {Discharge (Optional):54176}    Final  diagnoses:   None        ED Disposition     None          This medical record created using voice recognition software.

## 2023-05-04 ENCOUNTER — HOSPITAL ENCOUNTER (EMERGENCY)
Facility: HOSPITAL | Age: 28
Discharge: HOME | End: 2023-05-04
Attending: EMERGENCY MEDICINE
Payer: COMMERCIAL

## 2023-05-04 ENCOUNTER — HOSPITAL ENCOUNTER (EMERGENCY)
Facility: HOSPITAL | Age: 28
Discharge: HOME | End: 2023-05-04
Payer: COMMERCIAL

## 2023-05-04 VITALS
TEMPERATURE: 98.6 F | SYSTOLIC BLOOD PRESSURE: 121 MMHG | OXYGEN SATURATION: 96 % | BODY MASS INDEX: 27.53 KG/M2 | DIASTOLIC BLOOD PRESSURE: 70 MMHG | RESPIRATION RATE: 20 BRPM | WEIGHT: 171.3 LBS | HEART RATE: 84 BPM | HEIGHT: 66 IN

## 2023-05-04 DIAGNOSIS — G43.909 MIGRAINE WITHOUT STATUS MIGRAINOSUS, NOT INTRACTABLE, UNSPECIFIED MIGRAINE TYPE: ICD-10-CM

## 2023-05-04 DIAGNOSIS — K59.00 CONSTIPATION, UNSPECIFIED CONSTIPATION TYPE: Primary | ICD-10-CM

## 2023-05-04 LAB
ALBUMIN SERPL-MCNC: 4.9 G/DL (ref 3.5–5.2)
ALBUMIN/GLOB SERPL: 1.5 G/DL
ALP SERPL-CCNC: 79 U/L (ref 39–117)
ALT SERPL W P-5'-P-CCNC: 36 U/L (ref 1–33)
ANION GAP SERPL CALCULATED.3IONS-SCNC: 12.5 MMOL/L (ref 5–15)
AST SERPL-CCNC: 28 U/L (ref 1–32)
BASOPHILS # BLD AUTO: 0.05 10*3/MM3 (ref 0–0.2)
BASOPHILS NFR BLD AUTO: 0.9 % (ref 0–1.5)
BILIRUB SERPL-MCNC: 0.5 MG/DL (ref 0–1.2)
BUN SERPL-MCNC: 12 MG/DL (ref 6–20)
BUN/CREAT SERPL: 17.4 (ref 7–25)
CALCIUM SPEC-SCNC: 9.9 MG/DL (ref 8.6–10.5)
CHLORIDE SERPL-SCNC: 99 MMOL/L (ref 98–107)
CO2 SERPL-SCNC: 26.5 MMOL/L (ref 22–29)
CREAT SERPL-MCNC: 0.69 MG/DL (ref 0.57–1)
DEPRECATED RDW RBC AUTO: 38.6 FL (ref 37–54)
EGFRCR SERPLBLD CKD-EPI 2021: 122.9 ML/MIN/1.73
EOSINOPHIL # BLD AUTO: 0.07 10*3/MM3 (ref 0–0.4)
EOSINOPHIL NFR BLD AUTO: 1.2 % (ref 0.3–6.2)
ERYTHROCYTE [DISTWIDTH] IN BLOOD BY AUTOMATED COUNT: 12.5 % (ref 12.3–15.4)
GLOBULIN UR ELPH-MCNC: 3.2 GM/DL
GLUCOSE SERPL-MCNC: 117 MG/DL (ref 65–99)
HCG INTACT+B SERPL-ACNC: <0.5 MIU/ML
HCT VFR BLD AUTO: 36.4 % (ref 34–46.6)
HGB BLD-MCNC: 12.6 G/DL (ref 12–15.9)
HOLD SPECIMEN: NORMAL
HOLD SPECIMEN: NORMAL
IMM GRANULOCYTES # BLD AUTO: 0.02 10*3/MM3 (ref 0–0.05)
IMM GRANULOCYTES NFR BLD AUTO: 0.3 % (ref 0–0.5)
LIPASE SERPL-CCNC: 32 U/L (ref 13–60)
LYMPHOCYTES # BLD AUTO: 1.83 10*3/MM3 (ref 0.7–3.1)
LYMPHOCYTES NFR BLD AUTO: 31.2 % (ref 19.6–45.3)
MCH RBC QN AUTO: 29.5 PG (ref 26.6–33)
MCHC RBC AUTO-ENTMCNC: 34.6 G/DL (ref 31.5–35.7)
MCV RBC AUTO: 85.2 FL (ref 79–97)
MONOCYTES # BLD AUTO: 0.54 10*3/MM3 (ref 0.1–0.9)
MONOCYTES NFR BLD AUTO: 9.2 % (ref 5–12)
NEUTROPHILS NFR BLD AUTO: 3.36 10*3/MM3 (ref 1.7–7)
NEUTROPHILS NFR BLD AUTO: 57.2 % (ref 42.7–76)
NRBC BLD AUTO-RTO: 0 /100 WBC (ref 0–0.2)
PLATELET # BLD AUTO: 266 10*3/MM3 (ref 140–450)
PMV BLD AUTO: 10.5 FL (ref 6–12)
POTASSIUM SERPL-SCNC: 3.6 MMOL/L (ref 3.5–5.2)
PROT SERPL-MCNC: 8.1 G/DL (ref 6–8.5)
RBC # BLD AUTO: 4.27 10*6/MM3 (ref 3.77–5.28)
SARS-COV-2 RNA RESP QL NAA+PROBE: NOT DETECTED
SODIUM SERPL-SCNC: 138 MMOL/L (ref 136–145)
WBC NRBC COR # BLD: 5.87 10*3/MM3 (ref 3.4–10.8)
WHOLE BLOOD HOLD COAG: NORMAL
WHOLE BLOOD HOLD SPECIMEN: NORMAL

## 2023-05-04 PROCEDURE — 85025 COMPLETE CBC W/AUTO DIFF WBC: CPT

## 2023-05-04 PROCEDURE — 25010000002 ONDANSETRON PER 1 MG: Performed by: EMERGENCY MEDICINE

## 2023-05-04 PROCEDURE — 96375 TX/PRO/DX INJ NEW DRUG ADDON: CPT

## 2023-05-04 PROCEDURE — 36415 COLL VENOUS BLD VENIPUNCTURE: CPT

## 2023-05-04 PROCEDURE — 96374 THER/PROPH/DIAG INJ IV PUSH: CPT

## 2023-05-04 PROCEDURE — 25010000002 METOCLOPRAMIDE PER 10 MG: Performed by: REGISTERED NURSE

## 2023-05-04 PROCEDURE — 99283 EMERGENCY DEPT VISIT LOW MDM: CPT

## 2023-05-04 PROCEDURE — 83690 ASSAY OF LIPASE: CPT

## 2023-05-04 PROCEDURE — 80053 COMPREHEN METABOLIC PANEL: CPT

## 2023-05-04 PROCEDURE — 84702 CHORIONIC GONADOTROPIN TEST: CPT

## 2023-05-04 PROCEDURE — 25010000002 KETOROLAC TROMETHAMINE PER 15 MG: Performed by: REGISTERED NURSE

## 2023-05-04 PROCEDURE — 25010000002 DIPHENHYDRAMINE PER 50 MG: Performed by: REGISTERED NURSE

## 2023-05-04 RX ORDER — DIPHENHYDRAMINE HYDROCHLORIDE 50 MG/ML
25 INJECTION INTRAMUSCULAR; INTRAVENOUS ONCE
Status: COMPLETED | OUTPATIENT
Start: 2023-05-04 | End: 2023-05-04

## 2023-05-04 RX ORDER — SODIUM CHLORIDE 0.9 % (FLUSH) 0.9 %
10 SYRINGE (ML) INJECTION AS NEEDED
Status: DISCONTINUED | OUTPATIENT
Start: 2023-05-04 | End: 2023-05-04 | Stop reason: HOSPADM

## 2023-05-04 RX ORDER — KETOROLAC TROMETHAMINE 15 MG/ML
15 INJECTION, SOLUTION INTRAMUSCULAR; INTRAVENOUS ONCE
Status: COMPLETED | OUTPATIENT
Start: 2023-05-04 | End: 2023-05-04

## 2023-05-04 RX ORDER — ONDANSETRON 2 MG/ML
4 INJECTION INTRAMUSCULAR; INTRAVENOUS ONCE
Status: COMPLETED | OUTPATIENT
Start: 2023-05-04 | End: 2023-05-04

## 2023-05-04 RX ORDER — SODIUM PHOSPHATE,MONO-DIBASIC 19G-7G/118
1 ENEMA (ML) RECTAL ONCE
Status: COMPLETED | OUTPATIENT
Start: 2023-05-04 | End: 2023-05-04

## 2023-05-04 RX ORDER — ONDANSETRON 4 MG/1
4 TABLET, ORALLY DISINTEGRATING ORAL EVERY 8 HOURS PRN
Qty: 30 TABLET | Refills: 0 | Status: SHIPPED | OUTPATIENT
Start: 2023-05-04 | End: 2023-05-14

## 2023-05-04 RX ORDER — METOCLOPRAMIDE HYDROCHLORIDE 5 MG/ML
10 INJECTION INTRAMUSCULAR; INTRAVENOUS ONCE
Status: COMPLETED | OUTPATIENT
Start: 2023-05-04 | End: 2023-05-04

## 2023-05-04 RX ADMIN — SODIUM CHLORIDE 1000 ML: 9 INJECTION, SOLUTION INTRAVENOUS at 11:01

## 2023-05-04 RX ADMIN — SODIUM PHOSPHATE 1 ENEMA: 7; 19 ENEMA RECTAL at 12:21

## 2023-05-04 RX ADMIN — DIPHENHYDRAMINE HYDROCHLORIDE 25 MG: 50 INJECTION, SOLUTION INTRAMUSCULAR; INTRAVENOUS at 11:53

## 2023-05-04 RX ADMIN — METOCLOPRAMIDE HYDROCHLORIDE 10 MG: 5 INJECTION INTRAMUSCULAR; INTRAVENOUS at 11:53

## 2023-05-04 RX ADMIN — KETOROLAC TROMETHAMINE 15 MG: 15 INJECTION, SOLUTION INTRAMUSCULAR; INTRAVENOUS at 11:53

## 2023-05-04 RX ADMIN — ONDANSETRON 4 MG: 2 INJECTION INTRAMUSCULAR; INTRAVENOUS at 11:02

## 2023-05-04 NOTE — DISCHARGE INSTRUCTIONS
Your x-ray yesterday showed moderate amount of stool in your colon which is consistent with constipation.  You can repeat the enema once you get home for further relief of constipation.  Increase your fiber and fluid intake.  Increase your vegetable intake.  Refrain from taking laxatives daily.  You may try taking a stool softener for the next couple of weeks.    Please follow-up with your PCP to manage your chronic headaches.    Return for any new concerns.

## 2023-05-04 NOTE — Clinical Note
[facility] EMERGENCY ROOM  [address]  [place] [address]  Phone: [phone]    Areli Hernandez was seen and treated in our emergency department on 5/4/2023.  She may return to work on 05/05/2023.         Thank you for choosing [facility].    Harley Arguello MD

## 2023-05-04 NOTE — ED PROVIDER NOTES
"Time: 11:39 AM EDT  Date of encounter:  5/4/2023  Independent Historian/Clinical History and Information was obtained by:   Patient and Family  Chief Complaint: Nausea/vomiting, abdominal pain, constipation    History is limited by: N/A    History of Present Illness:  Patient is a 28 y.o. year old female who presents to the emergency department for evaluation of nausea/vomiting, abdominal pain, headache.  Patient states she was here last night but wait was too long and she went home.  Patient also reports constipation, states her last bowel movement was about 2 weeks ago.  Patient denies fever/chills,, diarrhea, or bloody stools.    HPI    Patient Care Team  Primary Care Provider: Karen Fox APRN    Past Medical History:     No Known Allergies  Past Medical History:   Diagnosis Date   • Migraines      No past surgical history on file.  No family history on file.    Home Medications:  Prior to Admission medications    Medication Sig Start Date End Date Taking? Authorizing Provider   albuterol sulfate  (90 Base) MCG/ACT inhaler Inhale 2 puffs Every 4 (Four) Hours As Needed for Wheezing. 6/21/22   Shanice Espinal APRN        Social History:   Social History     Tobacco Use   • Smoking status: Some Days   • Smokeless tobacco: Never   • Tobacco comments:     Electronic cigarette   Vaping Use   • Vaping Use: Every day   Substance Use Topics   • Alcohol use: Yes     Comment: OCCASIONAL   • Drug use: Never         Review of Systems:  Review of Systems   I performed a 10 point review of systems which was all negative, except for the positives found in the HPI above.  Physical Exam:  /70   Pulse 84   Temp 98.6 °F (37 °C) (Oral)   Resp 20   Ht 167.6 cm (66\")   Wt 77.7 kg (171 lb 4.8 oz)   SpO2 96%   BMI 27.65 kg/m²     Physical Exam     General: Awake alert and in mild distress     HEENT: Head normocephalic atraumatic, eyes PERRLA EOMI, nose normal, oropharynx normal.     Neck: Supple full range " of motion, no meningismus, no lymphadenopathy     Heart: Regular rate and rhythm, no murmurs or rubs, 2+ radial pulses bilaterally     Lungs: Clear to auscultation bilaterally without wheezes or crackles, no respiratory distress     Abdomen: Soft, diffuse tenderness, nondistended, no rebound or guarding, no rigidity     Back exam:  No L-spine tenderness.  No rash.     Skin: Warm, dry, no rash     Musculoskeletal: Normal range of motion, no lower extremity edema     Neurologic: Oriented x3, no motor deficits no sensory deficits     Psychiatric: Mood appears stable, no psychosis          Procedures:  Procedures      Medical Decision Making:      Comorbidities that affect care:    None    External Notes reviewed:          The following orders were placed and all results were independently analyzed by me:  Orders Placed This Encounter   Procedures   • [place] Draw   • Comprehensive Metabolic Panel   • Lipase   • Urinalysis With Microscopic If Indicated (No Culture) - Urine, Clean Catch   • hCG, Quantitative, Pregnancy   • CBC Auto Differential   • NPO Diet NPO Type: Strict NPO   • Undress & Gown   • Vital Signs Recheck   • Insert Peripheral IV   • CBC & Differential   • Green Top (Gel)   • Lavender Top   • Gold Top - SST   • Light Blue Top       Medications Given in the Emergency Department:  Medications   sodium chloride 0.9 % flush 10 mL (has no administration in time range)   ondansetron (ZOFRAN) injection 4 mg (4 mg Intravenous Given 5/4/23 1102)   sodium chloride 0.9 % bolus 1,000 mL (0 mL Intravenous Stopped 5/4/23 1345)   diphenhydrAMINE (BENADRYL) injection 25 mg (25 mg Intravenous Given 5/4/23 1153)   metoclopramide (REGLAN) injection 10 mg (10 mg Intravenous Given 5/4/23 1153)   ketorolac (TORADOL) injection 15 mg (15 mg Intravenous Given 5/4/23 1153)   Fleets enema 1 enema (1 enema Rectal Given 5/4/23 1221)        ED Course:         Labs:    Lab Results (last 24 hours)     Procedure Component Value Units  Date/Time    COVID-19,APTIMA PANTHER(NILES),[facility]/ [facility], NP/OP SWAB IN UTM/VTM/[facility] TRANSPORT MEDIA,24 HR TAT - Swab, Nasopharynx [[id number]]  (Normal) Collected: 05/03/23 1826    Specimen: Swab from Nasopharynx Updated: 05/04/23 0231     COVID19 Not Detected    Narrative:      Fact sheet for providers: https://www.fda.gov/media/416329/download     Fact sheet for patients: https://www.fda.gov/media/988718/download    Test performed by RT PCR.    Influenza Antigen, Rapid - Swab, Nasopharynx [[id number]]  (Normal) Collected: 05/03/23 1829    Specimen: Swab from Nasopharynx Updated: 05/03/23 1917     Influenza A Ag, EIA Negative     Influenza B Ag, EIA Negative    CBC & Differential [[id number]]  (Abnormal) Collected: 05/03/23 1837    Specimen: Blood Updated: 05/03/23 1858    Narrative:      The following orders were created for panel order CBC & Differential.  Procedure                               Abnormality         Status                     ---------                               -----------         ------                     CBC Auto Differential[[id number]]        Abnormal            Final result                 Please view results for these tests on the individual orders.    Comprehensive Metabolic Panel [[id number]]  (Abnormal) Collected: 05/03/23 1837    Specimen: Blood Updated: 05/03/23 1919     Glucose 119 mg/dL      BUN 11 mg/dL      Creatinine 0.75 mg/dL      Sodium 139 mmol/L      Potassium 4.0 mmol/L      Chloride 103 mmol/L      CO2 25.3 mmol/L      Calcium 9.7 mg/dL      Total Protein 7.9 g/dL      Albumin 4.8 g/dL      ALT (SGPT) 38 U/L      AST (SGOT) 29 U/L      Alkaline Phosphatase 88 U/L      Total Bilirubin 0.4 mg/dL      Globulin 3.1 gm/dL      A/G Ratio 1.5 g/dL      BUN/Creatinine Ratio 14.7     Anion Gap 10.7 mmol/L      eGFR 112.8 mL/min/1.73     Narrative:      GFR Normal >60  Chronic Kidney Disease <60  Kidney Failure <15      Lipase [[id number]]  (Normal) Collected: 05/03/23 1837     Specimen: Blood Updated: 05/03/23 1919     Lipase 30 U/L     hCG, Quantitative, Pregnancy [[id number]] Collected: 05/03/23 1837    Specimen: Blood Updated: 05/03/23 1916     HCG Quantitative <0.50 mIU/mL     Narrative:      HCG Ranges by Gestational Age    Females - non-pregnant premenopausal   </= 1mIU/mL HCG  Females - postmenopausal               </= 7mIU/mL HCG    3 Weeks       5.4   -      72 mIU/mL  4 Weeks      10.2   -     708 mIU/mL  5 Weeks       217   -   8,245 mIU/mL  6 Weeks       152   -  32,177 mIU/mL  7 Weeks     4,059   - 153,767 mIU/mL  8 Weeks    31,366   - 149,094 mIU/mL  9 Weeks    59,109   - 135,901 mIU/mL  10 Weeks   44,186   - 170,409 mIU/mL  12 Weeks   27,107   - 201,615 mIU/mL  14 Weeks   24,302   -  93,646 mIU/mL  15 Weeks   12,540   -  69,747 mIU/mL  16 Weeks    8,904   -  55,332 mIU/mL  17 Weeks    8,240   -  51,793 mIU/mL  18 Weeks    9,649   -  55,271 mIU/mL      CBC Auto Differential [[id number]]  (Abnormal) Collected: 05/03/23 1837    Specimen: Blood Updated: 05/03/23 1858     WBC 6.50 10*3/mm3      RBC 4.50 10*6/mm3      Hemoglobin 13.1 g/dL      Hematocrit 38.7 %      MCV 86.0 fL      MCH 29.1 pg      MCHC 33.9 g/dL      RDW 12.1 %      RDW-SD 38.2 fl      MPV 10.4 fL      Platelets 275 10*3/mm3      Neutrophil % 77.3 %      Lymphocyte % 16.9 %      Monocyte % 4.5 %      Eosinophil % 0.2 %      Basophil % 0.8 %      Immature Grans % 0.3 %      Neutrophils, Absolute 5.03 10*3/mm3      Lymphocytes, Absolute 1.10 10*3/mm3      Monocytes, Absolute 0.29 10*3/mm3      Eosinophils, Absolute 0.01 10*3/mm3      Basophils, Absolute 0.05 10*3/mm3      Immature Grans, Absolute 0.02 10*3/mm3      nRBC 0.0 /100 WBC     CBC & Differential [[id number]]  (Normal) Collected: 05/04/23 0942    Specimen: Blood Updated: 05/04/23 0948    Narrative:      The following orders were created for panel order CBC & Differential.  Procedure                               Abnormality         Status                      ---------                               -----------         ------                     CBC Auto Differential[[id number]]        Normal              Final result                 Please view results for these tests on the individual orders.    Comprehensive Metabolic Panel [[id number]]  (Abnormal) Collected: 05/04/23 0942    Specimen: Blood Updated: 05/04/23 1006     Glucose 117 mg/dL      BUN 12 mg/dL      Creatinine 0.69 mg/dL      Sodium 138 mmol/L      Potassium 3.6 mmol/L      Chloride 99 mmol/L      CO2 26.5 mmol/L      Calcium 9.9 mg/dL      Total Protein 8.1 g/dL      Albumin 4.9 g/dL      ALT (SGPT) 36 U/L      AST (SGOT) 28 U/L      Alkaline Phosphatase 79 U/L      Total Bilirubin 0.5 mg/dL      Globulin 3.2 gm/dL      A/G Ratio 1.5 g/dL      BUN/Creatinine Ratio 17.4     Anion Gap 12.5 mmol/L      eGFR 122.9 mL/min/1.73     Narrative:      GFR Normal >60  Chronic Kidney Disease <60  Kidney Failure <15      Lipase [[id number]]  (Normal) Collected: 05/04/23 0942    Specimen: Blood Updated: 05/04/23 1006     Lipase 32 U/L     hCG, Quantitative, Pregnancy [[id number]] Collected: 05/04/23 0942    Specimen: Blood Updated: 05/04/23 1011     HCG Quantitative <0.50 mIU/mL     Narrative:      HCG Ranges by Gestational Age    Females - non-pregnant premenopausal   </= 1mIU/mL HCG  Females - postmenopausal               </= 7mIU/mL HCG    3 Weeks       5.4   -      72 mIU/mL  4 Weeks      10.2   -     708 mIU/mL  5 Weeks       217   -   8,245 mIU/mL  6 Weeks       152   -  32,177 mIU/mL  7 Weeks     4,059   - 153,767 mIU/mL  8 Weeks    31,366   - 149,094 mIU/mL  9 Weeks    59,109   - 135,901 mIU/mL  10 Weeks   44,186   - 170,409 mIU/mL  12 Weeks   27,107   - 201,615 mIU/mL  14 Weeks   24,302   -  93,646 mIU/mL  15 Weeks   12,540   -  69,747 mIU/mL  16 Weeks    8,904   -  55,332 mIU/mL  17 Weeks    8,240   -  51,793 mIU/mL  18 Weeks    9,649   -  55,271 mIU/mL      CBC Auto Differential [[id number]]  (Normal) Collected:  05/04/23 0942    Specimen: Blood Updated: 05/04/23 0948     WBC 5.87 10*3/mm3      RBC 4.27 10*6/mm3      Hemoglobin 12.6 g/dL      Hematocrit 36.4 %      MCV 85.2 fL      MCH 29.5 pg      MCHC 34.6 g/dL      RDW 12.5 %      RDW-SD 38.6 fl      MPV 10.5 fL      Platelets 266 10*3/mm3      Neutrophil % 57.2 %      Lymphocyte % 31.2 %      Monocyte % 9.2 %      Eosinophil % 1.2 %      Basophil % 0.9 %      Immature Grans % 0.3 %      Neutrophils, Absolute 3.36 10*3/mm3      Lymphocytes, Absolute 1.83 10*3/mm3      Monocytes, Absolute 0.54 10*3/mm3      Eosinophils, Absolute 0.07 10*3/mm3      Basophils, Absolute 0.05 10*3/mm3      Immature Grans, Absolute 0.02 10*3/mm3      nRBC 0.0 /100 WBC            Imaging:    XR Abdomen Flat & Upright    Result Date: 5/4/2023  PROCEDURE: XR ABDOMEN FLAT AND UPRIGHT  (THREE VIEWS)  COMPARISON: None.  INDICATIONS: CONSTIPATION; UNSPECIFIED ABDOMINAL PAIN; NAUSEA X 2 DAYS.  DISCUSSION: Three views of the abdomen and pelvis reveal a nonobstructive bowel gas pattern and no pneumoperitoneum.  Formed stool is seen throughout the colon.  Fecal impaction is possible.  There may be fecal stasis as with constipation.  Thoracolumbar scoliosis is suggested.  The imaged lungs are clear of acute infiltrate.  No cardiac enlargement.  An intrauterine device (IUD) is projected over the central pelvis.       A nonobstructive bowel gas pattern is noted.  No pneumoperitoneum.  Constipation with possible fecal impaction is suggested radiographically.    Please note that portions of this note were completed with a voice recognition program.  LISSA SEGURA JR, MD       Electronically Signed and Approved By: LISSA SEGURA JR, MD on 5/04/2023 at 0:25                  Differential Diagnosis and Discussion:    Abdominal Pain: Based on the patient's signs and symptoms, I considered abdominal aortic aneurysm, small bowel obstruction, pancreatitis, acute cholecystitis, acute appendecitis, peptic ulcer  disease, gastritis, colitis, endocrine disorders, irritable bowel syndrome and other differential diagnosis an etiology of the patient's abdominal pain.  Headache: Differential diagnosis includes but is not limited to migraine, cluster headache, hypertension, tumor, subarachnoid bleeding, pseudotumor cerebri, temporal arteritis, infections, tension headache, and TMJ syndrome.  Vomiting: Differential diagnosis includes but is not limited to migraine, labyrinthine disorders, psychogenic, metabolic and endocrine causes, peptic ulcer, gastric outlet obstruction, gastritis, gastroenteritis, appendicitis, intestinal obstruction, paralytic ileus, food poisoning, cholecystitis, acute hepatitis, acute pancreatitis, acute febrile illness, and myocardial infarction.        MDM  Number of Diagnoses or Management Options  Constipation, unspecified constipation type  Migraine without status migrainosus, not intractable, unspecified migraine type  Diagnosis management comments: Laboratory and diagnostic findings from yesterday and today are consistent with constipation.  The patient had good results with an enema and reports a tennis ball sized bowel movement.  She reports that the abdominal pain is improved significantly and her headache has resolved as well.  The patient is resting comfortably and feels better, is alert and in no distress. Repeat examination is unremarkable and benign; in particular, there's no discomfort at McBurney's point and there is no pulsatile mass. The history, exam, diagnostic testing, and current condition does not suggest acute appendicitis, bowel obstruction, acute cholecystitis, bowel perforation, major gastrointestinal bleeding, severe diverticulitis, abdominal aortic aneurysm, mesenteric ischemia, volvulus, sepsis, or other significant pathology that warrants further testing, continued ED treatment, admission, for surgical evaluation at this point. The vital signs have been stable. The patient  does not have uncontrollable pain, intractable vomiting, or other significant symptoms. The patient's condition is stable and appropriate for discharge from the emergency department.       Amount and/or Complexity of Data Reviewed  Clinical lab tests: ordered and reviewed  Decide to obtain previous medical records or to obtain history from someone other than the patient: yes (Abdominal x-ray from 5/3/2023 showed large amount of stool in the colon)    Risk of Complications, Morbidity, and/or Mortality  Presenting problems: moderate  Diagnostic procedures: low  Management options: low    Patient Progress  Patient progress: improved           Patient Care Considerations:    Abdominal x-ray, however this was performed yesterday and showed constipation with possible fecal stasis.      Consultants/Shared Management Plan:        Social Determinants of Health:          Disposition and Care Coordination:            Final diagnoses:   Constipation, unspecified constipation type   Migraine without status migrainosus, not intractable, unspecified migraine type        ED Disposition     ED Disposition   Discharge    Condition   Stable    Comment   --             This medical record created using voice recognition software.           Cheli Mazariegos, APRN  05/04/23 1325

## 2023-06-12 ENCOUNTER — OFFICE VISIT (OUTPATIENT)
Dept: INTERNAL MEDICINE | Facility: CLINIC | Age: 28
End: 2023-06-12
Payer: COMMERCIAL

## 2023-06-12 VITALS
SYSTOLIC BLOOD PRESSURE: 128 MMHG | HEART RATE: 65 BPM | OXYGEN SATURATION: 99 % | BODY MASS INDEX: 28.45 KG/M2 | WEIGHT: 177 LBS | DIASTOLIC BLOOD PRESSURE: 95 MMHG | TEMPERATURE: 98 F | HEIGHT: 66 IN | RESPIRATION RATE: 18 BRPM

## 2023-06-12 DIAGNOSIS — Z01.89 ROUTINE LAB DRAW: ICD-10-CM

## 2023-06-12 DIAGNOSIS — F41.1 GENERALIZED ANXIETY DISORDER: ICD-10-CM

## 2023-06-12 DIAGNOSIS — G43.119 INTRACTABLE MIGRAINE WITH AURA WITHOUT STATUS MIGRAINOSUS: ICD-10-CM

## 2023-06-12 DIAGNOSIS — Z76.89 ENCOUNTER TO ESTABLISH CARE: Primary | ICD-10-CM

## 2023-06-12 DIAGNOSIS — Z13.220 SCREENING FOR LIPID DISORDERS: ICD-10-CM

## 2023-06-12 PROCEDURE — 99214 OFFICE O/P EST MOD 30 MIN: CPT

## 2023-06-12 PROCEDURE — 1159F MED LIST DOCD IN RCRD: CPT

## 2023-06-12 PROCEDURE — 1160F RVW MEDS BY RX/DR IN RCRD: CPT

## 2023-06-12 RX ORDER — BUSPIRONE HYDROCHLORIDE 7.5 MG/1
7.5 TABLET ORAL 2 TIMES DAILY PRN
Qty: 60 TABLET | Refills: 0 | Status: SHIPPED | OUTPATIENT
Start: 2023-06-12

## 2023-06-12 RX ORDER — RIMEGEPANT SULFATE 75 MG/75MG
75 TABLET, ORALLY DISINTEGRATING ORAL DAILY PRN
Qty: 4 TABLET | Refills: 0 | COMMUNITY
Start: 2023-06-12

## 2023-06-12 NOTE — ASSESSMENT & PLAN NOTE
She does have a history of migraines.  Patient states she has always had migraines.  She states she had a CT of her head when she was in high school.  She was on Maxalt for her migraines and it may have helped 1/3 times when she took it. She states that she gets one migraine every 1-2 weeks but sometimes they last 2-3 days. She states she is sensitive to light a noise when this happens.  Denies any vision changes.  She states that sometimes she will get nauseated when she gets a migraine.  She states she has lay down in a dark room without any noise.  We will get some updated imaging.  We will give patient a sample of Nurtec to try as well.  She will start that when she feels like a migraine is coming on.  Do not use more than 1 in 24-hour period.

## 2023-06-12 NOTE — PROGRESS NOTES
Chief Complaint  Establish Care (Pt states that she is being seen to establish care with Rema. ) and Migraine (Pt states that she has migraines that her life revolves around. She states that it has been going on since she was a kid. She states that she had test done since she was 19 years of age. She states that she can see her head beating out of her head some times. She states that she will get nauseas from the migraines.)    History of Present Illness  SUBJECTIVE  Areli Hernandez presents to [facility] to establish care.    Medical problems discussed with patient and updated in chart.    She does have a history of migraines. She states she thinks it may originate in her neck and radiates up. Patient played volleyball and was a cheerleader as well. She states she had a CT of her head when she was in high school.  She was on Maxalt for her migraines and it may have helped 1/3 times when she took it. She states that she gets one migraine every 1-2 weeks but sometimes they last 2-3 days. She recently got a new job. She states she is sensitive to light a noise when this happens.     She works at [employer].  Tobacco use-vapes sometimes, denies tobacco use  Pap-12/2022, [place]    Denies any chest pain, shortness of breath, palpitations, nausea, vomiting, diarrhea, issues with bowel or bladder function.    Past Medical History:   Diagnosis Date    ADHD (attention deficit hyperactivity disorder) 2008    Anxiety 2013    Migraines       Family History   Problem Relation Age of Onset    Cancer Mother         Breast cancer      History reviewed. No pertinent surgical history.     Current Outpatient Medications:     albuterol sulfate  (90 Base) MCG/ACT inhaler, Inhale 2 puffs Every 4 (Four) Hours As Needed for Wheezing. (Patient taking differently: Inhale 2 puffs As Needed for Wheezing.), Disp: 6.7 g, Rfl: 0    busPIRone (BUSPAR) 7.5 MG tablet, Take 1 tablet by mouth 2  "(Two) Times a Day As Needed (anxiety)., Disp: 60 tablet, Rfl: 0    Rimegepant Sulfate (Nurtec) 75 MG tablet dispersible tablet, Take 1 tablet by mouth Daily As Needed (migraines). Only take 1 tablet in a 24 hour period., Disp: 4 tablet, Rfl: 0    OBJECTIVE  Vital Signs:   /95 (BP Location: Left arm)   Pulse 65   Temp 98 °F (36.7 °C) (Temporal)   Resp 18   Ht 167.6 cm (65.98\")   Wt 80.3 kg (177 lb)   SpO2 99%   BMI 28.58 kg/m²    Estimated body mass index is 28.58 kg/m² as calculated from the following:    Height as of this encounter: 167.6 cm (65.98\").    Weight as of this encounter: 80.3 kg (177 lb).     Wt Readings from Last 3 Encounters:   06/12/23 80.3 kg (177 lb)   05/04/23 77.7 kg (171 lb 4.8 oz)   05/03/23 77 kg (169 lb 12.1 oz)     BP Readings from Last 3 Encounters:   06/12/23 128/95   05/04/23 121/70   05/03/23 130/86       Physical Exam  Vitals and nursing note reviewed.   Constitutional:       Appearance: Normal appearance.   HENT:      Head: Normocephalic.   Eyes:      Extraocular Movements: Extraocular movements intact.      Conjunctiva/sclera: Conjunctivae normal.   Cardiovascular:      Rate and Rhythm: Normal rate and regular rhythm.      Heart sounds: Normal heart sounds. No murmur heard.  Pulmonary:      Effort: Pulmonary effort is normal.      Breath sounds: Normal breath sounds. No wheezing or rales.   Abdominal:      General: Bowel sounds are normal.      Palpations: Abdomen is soft.      Tenderness: There is no abdominal tenderness. There is no guarding.   Musculoskeletal:         General: No swelling. Normal range of motion.      Cervical back: Normal range of motion and neck supple.   Skin:     General: Skin is warm and dry.   Neurological:      General: No focal deficit present.      Mental Status: She is alert and oriented to person, place, and time. Mental status is at baseline.   Psychiatric:         Mood and Affect: Mood normal.         Behavior: Behavior normal.         " Thought Content: Thought content normal.         Judgment: Judgment normal.        Result Review        XR Abdomen Flat & Upright    Result Date: 5/4/2023   A nonobstructive bowel gas pattern is noted.  No pneumoperitoneum.  Constipation with possible fecal impaction is suggested radiographically.    Please note that portions of this note were completed with a voice recognition program.  LISSA SEGURA JR, MD       Electronically Signed and Approved By: LISSA SEGURA JR, MD on 5/04/2023 at 0:25                 The above data has been reviewed by ANDREY Acosta 06/12/2023 15:41 EDT.          Patient Care Team:  Rema Wills APRN as PCP - General (Internal Medicine)      ASSESSMENT & PLAN    Diagnoses and all orders for this visit:    1. Encounter to establish care (Primary)    2. Generalized anxiety disorder  Assessment & Plan:  Apprehensive about taking something daily.  She does admit to getting overwhelmed and agitated easily. She denies feeling down and depressed but does feel anxious.   Discussed possibly start lexapro; however, pt states she is afraid that she wont be able to take it daily  Will start buspar. R/b discussed.  Follow up in 1 month.  Patient to let me know if she has any side effects.      3. Intractable migraine with aura without status migrainosus  Assessment & Plan:    She does have a history of migraines.  Patient states she has always had migraines.  She states she had a CT of her head when she was in high school.  She was on Maxalt for her migraines and it may have helped 1/3 times when she took it. She states that she gets one migraine every 1-2 weeks but sometimes they last 2-3 days. She states she is sensitive to light a noise when this happens.  Denies any vision changes.  She states that sometimes she will get nauseated when she gets a migraine.  She states she has lay down in a dark room without any noise.  We will get some updated imaging.  We will give patient a sample of Nurtec  to try as well.  She will start that when she feels like a migraine is coming on.  Do not use more than 1 in 24-hour period.    Orders:  -     MRI Brain With & Without Contrast; Future    4. Routine lab draw  -     CBC w AUTO Differential; Future  -     Comprehensive metabolic panel; Future  -     TSH+Free T4; Future    5. Screening for lipid disorders  -     Lipid panel; Future    Other orders  -     busPIRone (BUSPAR) 7.5 MG tablet; Take 1 tablet by mouth 2 (Two) Times a Day As Needed (anxiety).  Dispense: 60 tablet; Refill: 0  -     Rimegepant Sulfate (Nurtec) 75 MG tablet dispersible tablet; Take 1 tablet by mouth Daily As Needed (migraines). Only take 1 tablet in a 24 hour period.  Dispense: 4 tablet; Refill: 0         Tobacco Use: Low Risk     Smoking Tobacco Use: Never    Smokeless Tobacco Use: Never    Passive Exposure: Not on file       Follow Up     Return in about 4 weeks (around 7/10/2023) for Annual physical.    Please note that portions of this note were completed with a voice recognition program.    Patient was given instructions and counseling regarding her condition or for health maintenance advice. Please see specific information pulled into the AVS if appropriate.   I have reviewed information obtained and documented by others and I have confirmed the accuracy of this documented note.    ANDREY Acosta

## 2023-06-12 NOTE — ASSESSMENT & PLAN NOTE
Apprehensive about taking something daily.  She does admit to getting overwhelmed and agitated easily. She denies feeling down and depressed but does feel anxious.   Discussed possibly start lexapro; however, pt states she is afraid that she wont be able to take it daily  Will start buspar. R/b discussed.  Follow up in 1 month.  Patient to let me know if she has any side effects.

## 2023-09-15 ENCOUNTER — OFFICE VISIT (OUTPATIENT)
Dept: PSYCHIATRY | Facility: CLINIC | Age: 28
End: 2023-09-15
Payer: COMMERCIAL

## 2023-09-15 VITALS
DIASTOLIC BLOOD PRESSURE: 78 MMHG | SYSTOLIC BLOOD PRESSURE: 121 MMHG | WEIGHT: 180 LBS | BODY MASS INDEX: 27.28 KG/M2 | HEART RATE: 82 BPM | HEIGHT: 68 IN

## 2023-09-15 DIAGNOSIS — F41.1 GENERALIZED ANXIETY DISORDER: ICD-10-CM

## 2023-09-15 DIAGNOSIS — F90.9 ATTENTION DEFICIT HYPERACTIVITY DISORDER (ADHD), UNSPECIFIED ADHD TYPE: Primary | ICD-10-CM

## 2023-09-15 DIAGNOSIS — F15.20 CAFFEINE DEPENDENCE: ICD-10-CM

## 2023-09-15 DIAGNOSIS — F17.200 NICOTINE DEPENDENCE DUE TO VAPING NON-TOBACCO PRODUCT: ICD-10-CM

## 2023-09-15 PROBLEM — R07.81 RIB PAIN ON RIGHT SIDE: Status: ACTIVE | Noted: 2021-12-21

## 2023-09-15 RX ORDER — ATOMOXETINE 40 MG/1
40 CAPSULE ORAL DAILY
Qty: 30 CAPSULE | Refills: 1 | Status: SHIPPED | OUTPATIENT
Start: 2023-09-15

## 2023-09-15 NOTE — PROGRESS NOTES
"[facility]  Initial Evaluation    Referring Provider:  Rema Wills, APRN  [address]  [address]  [place] [address]    Chief Complaint: \"I just started seeing my primary care... I had a doctor and [facility] for the last four years... nobody would see me here... I was kind of far away... I kind of stayed away from going to the doctor... migraines... anxiety... I'm not a lazy person... always had difficulties doing every days tasks and it affects my life; my job, relationships... I still feel like a kid.\"    History of Present Illness: Areli Hernandez is a 28 y.o. female who presents today for initial evaluation regarding anxious and focus/concentration symptoms. She presents unaccompanied in no acute distress and engages with me appropriately. Psychotropic regimen with which patient presents is described as partially effective.     History is positive for signs/symptoms suggestive of LYUBOV, ADHD: consistent and excessive worry across several domains of life that contributes to tension and irritability throughout the day, trouble concentrating, trouble completing tasks, making errors in routine tasks, issues remembering obligations, trouble with organization, fidgeting, and associated irritability/anxiety with these deficits. Denies recent issues with mood; no hx SI or SA.     With regard to ADHD: I am presumptively treating patient for this issue; history as provided today and presentation today would suggest a distinct possibility this is a contributing factor to patient's dysfunction in her roles and engagements irrespective to screening results. Patient has learned and successfully implemented compensatory coping skills that, with the Spiritism of layered stressors throughout adulthood, have begun to fail. Treating ADHD symptoms will likely be a concise and appropriate route to address anxiety and mood issues that are, at least to some degree, secondary to " deficits related to traits of ADHD. I will note in this case the patient did have a history of formal testing with positive results for ADHD.    I have counseled the patient with regard to diagnoses and the recommended treatment regimen as documented below: I will assume prescriptive responsibility for escitalopram. I will begin atomoxetine for ADHD and have advised this medication can contribute to issues with BP and HR, appetite suppression, insomnia, and mood changes with worst case scenario being new-onset SI; patient contracts for safety appropriately. Patient acknowledges the diagnoses per my rendered interpretation. Patient demonstrates awareness/understanding of viable alternatives for treatment as well as potential risks, benefits, and side effects associated with this regimen and is amenable to proceed in this fashion.     Recommended lifestyle changes: replace 2 caffeinated beverages with water daily, brisk 30 minute walks 3 days a week.    Psychiatric History:  Diagnoses: ADHD, anxiety  Outpatient history: last seen at 15 YO (parents did not want her on medication at the time)  Inpatient history: denies  Medication trials: buspirone (ineffective at 7.5 mg BID), may have tried an ADHD medication in the past and cannot recall the name or the effect of the medication  Other treatment modalities: has not done therapy  Presenting regimen: escitalopram 10 mg QD  Self harm: denies  Suicide attempts: denies    Social History:  Residence: lives in a town home with her boyfriend (been together three years, they will probably be engaged soon)  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 I grew up Protestant... until 12 years old... more recently more spiritual stuff with positivity and stuff like that\"  Exercise: denies  Dietary habits: defer  Sleep hygiene: defer  Social habits: no pertinent issues  Sunlight: no concern for under-exposure  Caffeine intake: drinks coffee, soda, energy drinks, occasional tea  Hydration habits: \"I could drink more\", 2 bottles of water daily at least   history: denies, but father was in     Social History     Socioeconomic History    Marital status: Single   Tobacco Use    Smoking status: Never    Smokeless tobacco: Never   Vaping Use    Vaping Use: Some days    Substances: Nicotine    Devices: Refillable tank   Substance and Sexual Activity    Alcohol use: Not Currently     Comment: Couple times during summer    Drug use: Never    Sexual activity: Yes     Partners: Male     Birth control/protection: I.U.D.     Access to Firearms: denies.    Tobacco use counseling/intervention: N/A, patient does not use tobacco; patient was counseled with regard to risks of tobacco use.    PHQ-9 Depression Screening  PHQ-9 Total Score: 10    Little interest or pleasure in doing things? 0-->not at all   Feeling down, depressed, or hopeless? 0-->not at all   Trouble falling or staying asleep, or sleeping too much? 2-->more than half the days   Feeling tired or having little energy? 2-->more than half the days   Poor appetite or overeating? 1-->several days   Feeling bad about yourself - or that you are a failure or have let yourself or your family down? 0-->not at all   Trouble concentrating on things, such as reading the newspaper or watching television? 3-->nearly every day   Moving or speaking so slowly that other people could have noticed? Or the opposite - being so fidgety or restless that you have been moving around a lot more than usual? 2-->more than half the days   Thoughts that you would be better off dead, or of hurting yourself " in some way? 0-->not at all   PHQ-9 Total Score 10     LYUBOV-7  Feeling nervous, anxious or on edge: More than half the days  Not being able to stop or control worrying: More than half the days  Worrying too much about different things: More than half the days  Trouble Relaxing: Several days  Being so restless that it is hard to sit still: Several days  Feeling afraid as if something awful might happen: More than half the days  Becoming easily annoyed or irritable: Nearly every day  LYUBOV 7 Total Score: 13  If you checked any problems, how difficult have these problems made it for you to do your work, take care of things at home, or get along with other people: Somewhat difficult    ASRS-v1.1  Part A  6/6  Part B  9/12    Total  15/18    Problem List:  Patient Active Problem List   Diagnosis    Intractable migraine with aura without status migrainosus    Generalized anxiety disorder    Rib pain on right side    Attention deficit hyperactivity disorder (ADHD)    Nicotine dependence due to vaping non-tobacco product    Caffeine dependence     Allergy:   No Known Allergies     Discontinued Medications:  There are no discontinued medications.    Current Medications:   Current Outpatient Medications   Medication Sig Dispense Refill    escitalopram (Lexapro) 10 MG tablet Take 1 tablet by mouth Daily. Take 1/2 tab daily for 1 week, then increase to 1 tablet daily. 30 tablet 2    Levonorgestrel (Mirena, 52 MG,) 20 MCG/DAY intrauterine device IUD 1 each by Intrauterine route.      Rimegepant Sulfate (Nurtec) 75 MG tablet dispersible tablet Take 1 tablet by mouth Every Other Day. 16 tablet 2    atomoxetine (Strattera) 40 MG capsule Take 1 capsule by mouth Daily. 30 capsule 1     No current facility-administered medications for this visit.     Past Medical History:  Past Medical History:   Diagnosis Date    ADHD (attention deficit hyperactivity disorder) 2008    Anxiety 2013    Migraines      Past Surgical History:  History  "reviewed. No pertinent surgical history.  Family History:   Family History   Problem Relation Age of Onset    Cancer Mother         Breast cancer     Mental Status Exam:   Appearance: well-groomed, sits upright, age-appropriate, above average habitus  Behavior: calm, cooperative, appropriate in demeanor, appropriate eye-contact  Mood/affect: euthymic / mood-congruent and appropriate in both range and amplitude  Speech: loquacious, otherwise within expected variance; appropriate rate, appropriate rhythm, appropriate tone; non-pressured  Thought Process: linear, goal-directed; no FOI or VIKI; abstraction intact  Thought Content: coherent, devoid of overt delusions/perceptual disturbances  SI/HI: denies both SI and HI; exhibits future-orientation, self-advocates appropriately, no regular self-harm, no appreciable intent  Memory: no overt deficits  Orientation: mildly distractible  Concentration: appropriate during interview  Intellectual capacity: presumptively average  Insight: fair by given history/exam  Judgment: appropriate by given history/exam  Psychomotor: fidgets  Gait: WNL    Review of Systems:  Review of Systems   Constitutional:  Positive for unexpected weight change. Negative for activity change and appetite change.   HENT:  Negative for drooling.    Eyes:  Positive for visual disturbance.   Respiratory:  Positive for chest tightness and shortness of breath.    Cardiovascular:  Positive for palpitations. Negative for chest pain.   Gastrointestinal:  Positive for nausea. Negative for abdominal pain and diarrhea.   Endocrine: Positive for cold intolerance and heat intolerance.   Genitourinary:  Negative for difficulty urinating and frequency.   Musculoskeletal:  Positive for neck stiffness. Negative for myalgias.   Skin:  Negative for rash.   Neurological:  Positive for headaches. Negative for dizziness, tremors, seizures and light-headedness.      Vital Signs:   /78   Pulse 82   Ht 172.7 cm (68\")   " Wt 81.6 kg (180 lb)   BMI 27.37 kg/m²      Lab Results:   Admission on 09/01/2023, Discharged on 09/01/2023   Component Date Value Ref Range Status    COVID19 09/01/2023 Not Detected  Not Detected - Ref. Range Final    POC Influenza A, Molecular 09/01/2023 Not Detected  Negative / Not Detected Final    POC Influenza B, Molecular 09/01/2023 Not Detected  Negative / Not Detected Final    Internal Control 09/01/2023 Passed  Passed Final    Lot Number 09/01/2023 [id number]   Final    Expiration Date 09/01/2023 4/30/2024   Final    Rapid Strep A Screen 09/01/2023 Negative  Negative, VALID, INVALID, Not Performed Final    Internal Control 09/01/2023 Passed  Passed Final    Lot Number 09/01/2023 [id number]   Final    Expiration Date 09/01/2023 111,924   Final   Admission on 05/04/2023, Discharged on 05/04/2023   Component Date Value Ref Range Status    Glucose 05/04/2023 117 (H)  65 - 99 mg/dL Final    BUN 05/04/2023 12  6 - 20 mg/dL Final    Creatinine 05/04/2023 0.69  0.57 - 1.00 mg/dL Final    Sodium 05/04/2023 138  136 - 145 mmol/L Final    Potassium 05/04/2023 3.6  3.5 - 5.2 mmol/L Final    Chloride 05/04/2023 99  98 - 107 mmol/L Final    CO2 05/04/2023 26.5  22.0 - 29.0 mmol/L Final    Calcium 05/04/2023 9.9  8.6 - 10.5 mg/dL Final    Total Protein 05/04/2023 8.1  6.0 - 8.5 g/dL Final    Albumin 05/04/2023 4.9  3.5 - 5.2 g/dL Final    ALT (SGPT) 05/04/2023 36 (H)  1 - 33 U/L Final    AST (SGOT) 05/04/2023 28  1 - 32 U/L Final    Alkaline Phosphatase 05/04/2023 79  39 - 117 U/L Final    Total Bilirubin 05/04/2023 0.5  0.0 - 1.2 mg/dL Final    Globulin 05/04/2023 3.2  gm/dL Final    A/G Ratio 05/04/2023 1.5  g/dL Final    BUN/Creatinine Ratio 05/04/2023 17.4  7.0 - 25.0 Final    Anion Gap 05/04/2023 12.5  5.0 - 15.0 mmol/L Final    eGFR 05/04/2023 122.9  >60.0 mL/min/1.73 Final    Lipase 05/04/2023 32  13 - 60 U/L Final    HCG Quantitative 05/04/2023 <0.50  mIU/mL Final    Extra Tube 05/04/2023 Hold for add-ons.   Final     Auto resulted.    Extra Tube 05/04/2023 hold for add-on   Final    Auto resulted    Extra Tube 05/04/2023 Hold for add-ons.   Final    Auto resulted.    Extra Tube 05/04/2023 Hold for add-ons.   Final    Auto resulted    WBC 05/04/2023 5.87  3.40 - 10.80 10*3/mm3 Final    RBC 05/04/2023 4.27  3.77 - 5.28 10*6/mm3 Final    Hemoglobin 05/04/2023 12.6  12.0 - 15.9 g/dL Final    Hematocrit 05/04/2023 36.4  34.0 - 46.6 % Final    MCV 05/04/2023 85.2  79.0 - 97.0 fL Final    MCH 05/04/2023 29.5  26.6 - 33.0 pg Final    MCHC 05/04/2023 34.6  31.5 - 35.7 g/dL Final    RDW 05/04/2023 12.5  12.3 - 15.4 % Final    RDW-SD 05/04/2023 38.6  37.0 - 54.0 fl Final    MPV 05/04/2023 10.5  6.0 - 12.0 fL Final    Platelets 05/04/2023 266  140 - 450 10*3/mm3 Final    Neutrophil % 05/04/2023 57.2  42.7 - 76.0 % Final    Lymphocyte % 05/04/2023 31.2  19.6 - 45.3 % Final    Monocyte % 05/04/2023 9.2  5.0 - 12.0 % Final    Eosinophil % 05/04/2023 1.2  0.3 - 6.2 % Final    Basophil % 05/04/2023 0.9  0.0 - 1.5 % Final    Immature Grans % 05/04/2023 0.3  0.0 - 0.5 % Final    Neutrophils, Absolute 05/04/2023 3.36  1.70 - 7.00 10*3/mm3 Final    Lymphocytes, Absolute 05/04/2023 1.83  0.70 - 3.10 10*3/mm3 Final    Monocytes, Absolute 05/04/2023 0.54  0.10 - 0.90 10*3/mm3 Final    Eosinophils, Absolute 05/04/2023 0.07  0.00 - 0.40 10*3/mm3 Final    Basophils, Absolute 05/04/2023 0.05  0.00 - 0.20 10*3/mm3 Final    Immature Grans, Absolute 05/04/2023 0.02  0.00 - 0.05 10*3/mm3 Final    nRBC 05/04/2023 0.0  0.0 - 0.2 /100 WBC Final   Admission on 05/04/2023, Discharged on 05/04/2023   Component Date Value Ref Range Status    Glucose 05/03/2023 119 (H)  65 - 99 mg/dL Final    BUN 05/03/2023 11  6 - 20 mg/dL Final    Creatinine 05/03/2023 0.75  0.57 - 1.00 mg/dL Final    Sodium 05/03/2023 139  136 - 145 mmol/L Final    Potassium 05/03/2023 4.0  3.5 - 5.2 mmol/L Final    Chloride 05/03/2023 103  98 - 107 mmol/L Final    CO2 05/03/2023 25.3  22.0 -  29.0 mmol/L Final    Calcium 05/03/2023 9.7  8.6 - 10.5 mg/dL Final    Total Protein 05/03/2023 7.9  6.0 - 8.5 g/dL Final    Albumin 05/03/2023 4.8  3.5 - 5.2 g/dL Final    ALT (SGPT) 05/03/2023 38 (H)  1 - 33 U/L Final    AST (SGOT) 05/03/2023 29  1 - 32 U/L Final    Alkaline Phosphatase 05/03/2023 88  39 - 117 U/L Final    Total Bilirubin 05/03/2023 0.4  0.0 - 1.2 mg/dL Final    Globulin 05/03/2023 3.1  gm/dL Final    A/G Ratio 05/03/2023 1.5  g/dL Final    BUN/Creatinine Ratio 05/03/2023 14.7  7.0 - 25.0 Final    Anion Gap 05/03/2023 10.7  5.0 - 15.0 mmol/L Final    eGFR 05/03/2023 112.8  >60.0 mL/min/1.73 Final    Lipase 05/03/2023 30  13 - 60 U/L Final    HCG Quantitative 05/03/2023 <0.50  mIU/mL Final    COVID19 05/03/2023 Not Detected  Not Detected - Ref. Range Final    Extra Tube 05/03/2023 Hold for add-ons.   Final    Auto resulted.    Extra Tube 05/03/2023 hold for add-on   Final    Auto resulted    Extra Tube 05/03/2023 Hold for add-ons.   Final    Auto resulted.    Extra Tube 05/03/2023 Hold for add-ons.   Final    Auto resulted    WBC 05/03/2023 6.50  3.40 - 10.80 10*3/mm3 Final    RBC 05/03/2023 4.50  3.77 - 5.28 10*6/mm3 Final    Hemoglobin 05/03/2023 13.1  12.0 - 15.9 g/dL Final    Hematocrit 05/03/2023 38.7  34.0 - 46.6 % Final    MCV 05/03/2023 86.0  79.0 - 97.0 fL Final    MCH 05/03/2023 29.1  26.6 - 33.0 pg Final    MCHC 05/03/2023 33.9  31.5 - 35.7 g/dL Final    RDW 05/03/2023 12.1 (L)  12.3 - 15.4 % Final    RDW-SD 05/03/2023 38.2  37.0 - 54.0 fl Final    MPV 05/03/2023 10.4  6.0 - 12.0 fL Final    Platelets 05/03/2023 275  140 - 450 10*3/mm3 Final    Neutrophil % 05/03/2023 77.3 (H)  42.7 - 76.0 % Final    Lymphocyte % 05/03/2023 16.9 (L)  19.6 - 45.3 % Final    Monocyte % 05/03/2023 4.5 (L)  5.0 - 12.0 % Final    Eosinophil % 05/03/2023 0.2 (L)  0.3 - 6.2 % Final    Basophil % 05/03/2023 0.8  0.0 - 1.5 % Final    Immature Grans % 05/03/2023 0.3  0.0 - 0.5 % Final    Neutrophils, Absolute  05/03/2023 5.03  1.70 - 7.00 10*3/mm3 Final    Lymphocytes, Absolute 05/03/2023 1.10  0.70 - 3.10 10*3/mm3 Final    Monocytes, Absolute 05/03/2023 0.29  0.10 - 0.90 10*3/mm3 Final    Eosinophils, Absolute 05/03/2023 0.01  0.00 - 0.40 10*3/mm3 Final    Basophils, Absolute 05/03/2023 0.05  0.00 - 0.20 10*3/mm3 Final    Immature Grans, Absolute 05/03/2023 0.02  0.00 - 0.05 10*3/mm3 Final    nRBC 05/03/2023 0.0  0.0 - 0.2 /100 WBC Final    Influenza A Ag, EIA 05/03/2023 Negative  Negative Final    Influenza B Ag, EIA 05/03/2023 Negative  Negative Final     EKG Results:  No orders to display     Imaging Results:  MRI Brain With & Without Contrast  Result Date: 7/18/2023    1. No acute intracranial abnormality 2. Mild mucosal inflammatory thickening in the bilateral ethmoid sinuses      Robert Vicente M.D.    ASSESSMENT AND PLAN:    ICD-10-CM ICD-9-CM   1. Attention deficit hyperactivity disorder (ADHD), unspecified ADHD type  F90.9 314.01   2. Generalized anxiety disorder  F41.1 300.02   3. Caffeine dependence  F15.20 304.40   4. Nicotine dependence due to vaping non-tobacco product  F17.200 305.1     28 y.o. female who presents today for initial evaluation regarding anxious and focus/concentration symptoms. We have discussed the history and interpreted diagnoses as above as well as the treatment plan below, including potential R/B/SE of the recommended regimen of which the patient demonstrates understanding. Patient is agreeable to call 911 or go to the nearest ER should she become concerned for her own safety and/or the safety of those around her. There are no overt indices of acute daisha/psychosis on evaluation today.     Medication regimen: continue escitalopram, begin atomoxetine 40 mg QAM; patient is advised not to misuse prescribed medications or to use any exogenous substances that aren't disclosed to this provider as they may interact with the regimen to her detriment.   Risk Assessment: protracted risk is low,  imminent risk is low.  Risk factors include: anxiety disorder, mood disorder, and recent/ongoing psychosocial stressors. Protective factors include: no known family history of suicidality, intact reality testing, no substance use disorder, no access to firearms, no present SI, no stated history of suicide attempts or self-harm, patient's exhibited future-orientation, strong social support, and patient's cooperation with care. Do note that this is subject to change with the Jain of new stressors, treatment non-adherence, use of substances, and/or new medical ails.  Monitoring: reviewed labs/imaging as populated above; PHQ-9 today is 10/27, LYUBOV-7 today is 13/21, ASRS today 16/18.  Therapy: refer virtual clinic  Follow-up: 6 weeks  Communications: N/A    TREATMENT PLAN/GOALS: challenge patterns of living conducive to symptom burden, implement recommended regimen as above with augmentative, intermittent supportive psychotherapy to reduce symptom burden. Patient acknowledged and verbally consented to begin treatment as above. The importance of adherence to the recommended treatment and interval follow-up appointments was emphasized today. Patient was today advised to limit daily caffeine intake, hydrate appropriately, eat healthy and nutritious foods, engage sleep hygiene measures, engage appropriate exposure to sunlight, engage with hobbies in balance with life necessities, and exercise appropriate to their capacity to do so.     Billing: I have seen the patient today and considered her psychiatric complaints, rendered a diagnosis, and discussed treatment with the patient as above with which she consents.    Parts of this note are electronic transcriptions/translations of spoken language to printed text using the Dragon Dictation system.    Electronically signed by Harley Hernandez MD, 09/15/23, 2555

## 2023-10-04 ENCOUNTER — TELEMEDICINE (OUTPATIENT)
Dept: PSYCHIATRY | Facility: CLINIC | Age: 28
End: 2023-10-04
Payer: COMMERCIAL

## 2023-10-04 DIAGNOSIS — F33.1 MAJOR DEPRESSIVE DISORDER, RECURRENT EPISODE, MODERATE WITH ANXIOUS DISTRESS: Primary | ICD-10-CM

## 2023-10-04 PROCEDURE — 1159F MED LIST DOCD IN RCRD: CPT | Performed by: COUNSELOR

## 2023-10-04 PROCEDURE — 1160F RVW MEDS BY RX/DR IN RCRD: CPT | Performed by: COUNSELOR

## 2023-10-04 NOTE — PROGRESS NOTES
"This provider is located at the [facility] ([facility] [address], [place] [address] using a secure [facility] Video Visit through [employer] ([employer]). Patient is being seen remotely via telehealth at home address in [place] and stated they are in a secure environment for this session. The patient's condition being diagnosed/treated is appropriate for telemedicine. The provider identified herself as well as her credentials. The patient, and/or patients guardian, consent to be seen remotely, and when consent is given they understand that the consent allows for patient identifiable information to be sent to a third party as needed. They may refuse to be seen remotely at any time. The electronic data is encrypted and password protected, and the patient and/or guardian has been advised of the potential risks to privacy not withstanding such measures.     You have chosen to receive care through a telehealth visit.  Do you consent to use a video/audio connection for your medical care today? Yes    Subjective   Areli Hernandez is a 28 y.o. female who presents today for initial evaluation  for symptoms associated with depression and anxiety.       Time in: 3:05  Time out:  Name of PCP: ANDREY Acosta  Referral source: Dr. Hay    Chief Complaint:  Anxiety, depression, ADHD    History of Present Illness:  Pt reports daily anxiety occurring throughout most days with symptoms including feeling on edge, thought rumination, excessive worry, inability to control worry, feeling panicky, and intrusive thoughts. Pt reports difficulty staying on task, inability to focus, inattentiveness, and over thinking, with symptoms occurring daily throughout the day.  Pt reports restlessness, difficulty standing in lines and waiting their turn, irritability when others move at what is perceived as a slower pace, and feeling like they are running on a motor.  Pt reports \"I don't do well with " "change\" and Pt states any change or alteration of Pt's routine causes anxiety and often results in panic attack.  Pt states she suffers from migraines, and she believes many of her migraines are stress related.    Patient adamantly and convincingly denies current suicidal or homicidal ideation or perceptual disturbance.    Childhood Experiences:   Has patient experienced a major accident or tragic events as a child? yes  A lot of arguing among family members in nuclear family household.     Has patient experienced any other significant life events or trauma (such as verbal, physical, sexual abuse)? no  Pt denies  \"I had really good parents and all my needs were met\"    Significant Life Events:  Has patient been through or witnessed a divorce? yes  Pt fight a lot and have been  but are together now and   Pt was  in 2016 and  in 2019   Pt has no children    Has patient experienced a death / loss of relationship? yes  Maternal grandmother passed away in 2018  Paternal grandmother passed away 2006    Has patient experienced a major accident or tragic events? yes  Pt was in a very unhealthy relationship for 2 years after divorce. Pt was scared to go to parents for help.    Has patient experienced any other significant life events or trauma (such as verbal, physical, sexual abuse)? no    Social History:   Social History     Socioeconomic History    Marital status: Single   Tobacco Use    Smoking status: Never    Smokeless tobacco: Never   Vaping Use    Vaping Use: Some days    Substances: Nicotine    Devices: Refillable tank   Substance and Sexual Activity    Alcohol use: Not Currently     Comment: Couple times during summer    Drug use: Never    Sexual activity: Yes     Partners: Male     Birth control/protection: I.U.D.     Marital Status:     Patient's current living situation: pt lives in a [facility] with her boyfriend and the have been living together since March 2023.    Support " "system: two parent,  family and significant other  Pt has an older brother 12 years older and older sister 7 years older    Difficulty getting along with peers: yes  \"I don't like change\" and it makes me have trouble getting along with people sometimes.    Difficulty making new friendships: no    Difficulty maintaining friendships: no    Close with family members: yes    Religous: yes I believe in God but I don't go to [employer].    Work History:  Highest level of education obtained: college    Ever been active duty in the ? 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      Legal History:  The patient has no significant history of legal issues.    Past Medical History:  Past Medical History:   Diagnosis Date    ADHD (attention deficit hyperactivity disorder) 2008    Anxiety 2013    Migraines        Past Surgical History:  History reviewed. No pertinent surgical history.    Physical Exam:   There were no vitals taken for this visit.   There is no height or weight on file to calculate BMI.     History of prior treatment or hospitalization: Pt denies prior psychiatric hospitalization.    Are there any significant health issues (current or past): chronic migrraines    History of seizures: no    Allergy:   No Known Allergies     Current Medications:   Current Outpatient Medications   Medication Sig Dispense Refill    atomoxetine (Strattera) 40 MG capsule Take 1 capsule by mouth Daily. 30 capsule 1    escitalopram (Lexapro) 10 MG tablet Take 1 tablet by mouth Daily. Take 1/2 tab daily for 1 week, then increase to 1 tablet daily. 30 tablet 2    Levonorgestrel (Mirena, 52 MG,) 20 MCG/DAY intrauterine device IUD 1 each by Intrauterine route.      Rimegepant Sulfate (Nurtec) 75 MG tablet dispersible tablet Take 1 tablet by mouth Every Other Day. 16 tablet 2 "     No current facility-administered medications for this visit.       Lab Results:   No visits with results within 1 Month(s) from this visit.   Latest known visit with results is:   Admission on 09/01/2023, Discharged on 09/01/2023   Component Date Value Ref Range Status    COVID19 09/01/2023 Not Detected  Not Detected - Ref. Range Final    POC Influenza A, Molecular 09/01/2023 Not Detected  Negative / Not Detected Final    POC Influenza B, Molecular 09/01/2023 Not Detected  Negative / Not Detected Final    Internal Control 09/01/2023 Passed  Passed Final    Lot Number 09/01/2023 [id number]   Final    Expiration Date 09/01/2023 4/30/2024   Final    Rapid Strep A Screen 09/01/2023 Negative  Negative, VALID, INVALID, Not Performed Final    Internal Control 09/01/2023 Passed  Passed Final    Lot Number 09/01/2023 #[id number]   Final    Expiration Date 09/01/2023 111,924   Final       Family History:  Family History   Problem Relation Age of Onset    Cancer Mother         Breast cancer       Problem List:  Patient Active Problem List   Diagnosis    Intractable migraine with aura without status migrainosus    Generalized anxiety disorder    Rib pain on right side    Attention deficit hyperactivity disorder (ADHD)    Nicotine dependence due to vaping non-tobacco product    Caffeine dependence         History of Substance Use:   Patient answered Yes to experiencing two or more of the following problems related to substance use: using more than intended or over longer period than intended; difficulty quitting or cutting back use; spending a great deal of time obtaining, using, or recovering from using; craving or strong desire or urge to use;  work and/or school problems; financial problems; family problems; using in dangerous situations; physical or mental health problems; relapse; feelings of guilt or remorse about use; times when used and/or drank alone; needing to use more in order to achieve the desired effect; illness  or withdrawal when stopping or cutting back use; using to relieve or avoid getting ill or developing withdrawal symptoms; and black outs and/or memory issues when using.        Substance Age Frequency Amount Method Last use   Nicotine 21 Daily Throughout the day vape current   Alcohol        Marijuana        Benzo        Pain Pills        Cocaine        Meth        Heroin        Suboxone        Synthetics/Other:            SUICIDE RISK ASSESSMENT/CSSRS  1. Does patient have thoughts of suicide? no  2. Does patient have intent for suicide? no  3. Does patient have a current plan for suicide? no  4. History of suicide attempts: no  5. Family history of suicide or attempts: no  6. History of violent behaviors towards others or property or thoughts of committing suicide: no  7. History of sexual aggression toward others: no  8. Access to firearms or weapons: no    PHQ-Score Total:  PHQ-9 Total Score: (P) 8    LYUBOV-7 Score Total:  Over the last two weeks, how often have you been bothered by the following problems?  Feeling nervous, anxious or on edge: (P) More than half the days  Not being able to stop or control worrying: (P) More than half the days  Worrying too much about different things: (P) More than half the days  Trouble Relaxing: (P) Several days  Being so restless that it is hard to sit still: (P) Not at all  Becoming easily annoyed or irritable: (P) Nearly every day  Feeling afraid as if something awful might happen: (P) More than half the days  LYUBOV 7 Total Score: (P) 12  If you checked any problems, how difficult have these problems made it for you to do your work, take care of things at home, or get along with other people: (P) Somewhat difficult      (Scales based on 0 - 10 with 10 being the worst)  Depression: 4 Anxiety: 8     Mental Status Exam:   Hygiene:   good  Cooperation:  Cooperative  Eye Contact:  Good  Psychomotor Behavior:  Appropriate  Affect:  Full range  Mood: normal  Hopelessness: 2  Speech:   Rapid  Thought Process:  Goal directed  Thought Content:  Normal  Suicidal:  None  Homicidal:  None  Hallucinations:  None  Delusion:  None  Memory:  Intact  Orientation:  Grossly intact  Reliability:  good  Insight:  Good  Judgement:  Good  Impulse Control:  Fair    Impression/Formulation:    Patient appeared alert and oriented.  Patient is voluntarily requesting to begin outpatient therapy at [facility].  Patient is receptive to assistance with maintaining a stable lifestyle.  Patient presents with history of anxiety, depression and ADHD.  Patient is agreeable to attend routine therapy sessions.  Patient expressed desire to maintain stability and participate in the therapeutic process.        Assessment & Plan   Problems Addressed this Visit    None  Visit Diagnoses       Major depressive disorder, recurrent episode, moderate with anxious distress    -  Primary          Diagnoses         Codes Comments    Major depressive disorder, recurrent episode, moderate with anxious distress    -  Primary ICD-10-CM: F33.1  ICD-9-CM: 296.32                Functional Status: Moderate impairment     Prognosis: Good with Ongoing Treatment     Treatment Plan: Continue supportive psychotherapy efforts and medications as indicated. Obtain release of information for current treatment team for continuity of care as needed. Patient will adhere to medication regimen as prescribed and report any side effects. Patient will contact this office, call 911 or present to the nearest emergency room should suicidal or homicidal ideations occur.    Short Term Goals: Patient will be compliant with medication, and patient will have no significant medication related side effects.  Patient will be engaged in psychotherapy as indicated.  Patient will report subjective improvement of symptoms.    Long Term Goals: To stabilize mood and treat/improve subjective symptoms, the patient will stay out of the hospital, the  patient will be at an optimal level of functioning, and the patient will take all medications as prescribed.The patient verbalized understanding and agreement with goals that were mutually set.    Crisis Plan:    If symptoms/behaviors persist, patient will present to the nearest hospital for an assessment. Advised patient of [facility] 24/7 assessment services.         This document has been electronically signed by CHELSEA Caicedo  October 5, 2023 15:58 EDT    Part of this note may be an electronic transcription/translation of spoken language to printed text using the Dragon Dictation System.

## 2023-10-09 ENCOUNTER — TELEPHONE (OUTPATIENT)
Dept: PSYCHIATRY | Facility: CLINIC | Age: 28
End: 2023-10-09
Payer: COMMERCIAL

## 2023-10-09 DIAGNOSIS — Z51.81 THERAPEUTIC DRUG MONITORING: Primary | ICD-10-CM

## 2023-10-09 NOTE — TELEPHONE ENCOUNTER
PT LEFT VOICEMAIL REQUESTING CALL BACK IN REGARDS TO DISCUSSING HER MEDICATIONS.     I CALLED PT BACK TO FOLLOW UP.  I SPOKE TO PT.    PT STATED AT HER APPT SHE WAS PUT ON STATTERA 40 MG CAPSULE.     PT STATES SHE IS ALMOST OUT OF THIS MEDICATION AND WILL NEED A REFILL BUT SHE DOES NOT LIKE THIS MEDICATION AT ALL AND DOES NOT WISH TO TAKE IT FOR ANOTHER MONTH UNTIL HER NEXT APPOINTMENT ON 11/08/2023.    PT STATES IT MAKES HER EXTREMELY TIRED AND SHE IS SLEEPING A LOT. DECREASED CONCENTRATION.    PROVIDER PLEASE ADVISE.

## 2023-10-09 NOTE — TELEPHONE ENCOUNTER
She may discontinue this medication. Would she like to trial another? Options would be:    A narcotic stimulant like Adderall. These medications work faster, but do come with risks of tolerance and dependence. SE could include reduced appetite, disrupted sleep, elevated HR/BP, worsened anxiety/irritability. We would need to do a drug screen and a controlled substance agreement.  Another non-stimulant option, perhaps Wellbutrin, which has the same potential SE except for dependence and tolerance. This medication should not be taken if she has a history of seizures.

## 2023-10-10 NOTE — TELEPHONE ENCOUNTER
ATTEMPTED TO CONTACT PT PER PROVIDER'S INSTRUCTIONS      NO ANSWER      UNABLE TO LEAVE A VOICEMAIL WITH INSTRUCTIONS TO RETURN CALL TO OFFICE AT [phone]

## 2023-10-10 NOTE — TELEPHONE ENCOUNTER
Unfortunately, there's no way around the shortage at this moment. That is something we'll likely have to deal with - she may need to call to different pharmacies and report to use where to re-send scripts in that scenario. We can discuss a controlled substance agreement at follow-up. I'm ordering a urine drug screen for her to complete and will send for Adderall thereafter.

## 2023-10-10 NOTE — TELEPHONE ENCOUNTER
PT(PATIENT) VERIFIED     CONTACTED PT(PATIENT) PER PROVIDER'S INSTRUCTIONS     PT(PATIENT) STATES SHE DOES NOT HAVE A HX OF SEIZURES     PT(PATIENT) STATES SHE WAS ON A NON STIMULANT WHEN SHE WAS YOUNGER, AND DIDN'T HAVE GOOD EFFICACY WITH IT     PT(PATIENT) STATES SHE HAS BEEN DOING SOME RESEARCH ON STIMULANTS, AND THINKS SHE WANTS TO TRY SOMETHING     PT(PATIENT) STATES SHE WANTS TO FEEL BETTER AND DO BETTER WITH HER ADHD     NEXT APPT WITH PROVIDER  Appointment with Harley Hernandez MD (2023)     PT(PATIENT) CONFIRMED PHARMACY ON FILE AS CORRECT, BUT IS CONCERNED REGARDING NATIONAL SHORTAGE     PROVIDER PLEASE ADVISE

## 2023-10-11 NOTE — TELEPHONE ENCOUNTER
ATTEMPTED TO CONTACT PT PER PROVIDER'S INSTRUCTIONS      NO ANSWER     UNABLE TO LEAVE A VOICEMAIL WITH INSTRUCTIONS TO RETURN CALL TO OFFICE AT [phone]

## 2023-10-12 ENCOUNTER — LAB (OUTPATIENT)
Dept: LAB | Facility: HOSPITAL | Age: 28
End: 2023-10-12
Payer: COMMERCIAL

## 2023-10-12 LAB
AMPHET+METHAMPHET UR QL: NEGATIVE
BARBITURATES UR QL SCN: NEGATIVE
BENZODIAZ UR QL SCN: NEGATIVE
CANNABINOIDS SERPL QL: NEGATIVE
COCAINE UR QL: NEGATIVE
FENTANYL UR-MCNC: POSITIVE NG/ML
METHADONE UR QL SCN: NEGATIVE
OPIATES UR QL: NEGATIVE
OXYCODONE UR QL SCN: NEGATIVE

## 2023-10-12 PROCEDURE — 80307 DRUG TEST PRSMV CHEM ANLYZR: CPT | Performed by: PSYCHIATRY & NEUROLOGY

## 2023-10-18 ENCOUNTER — TELEPHONE (OUTPATIENT)
Dept: INTERNAL MEDICINE | Facility: CLINIC | Age: 28
End: 2023-10-18
Payer: COMMERCIAL

## 2023-10-28 DIAGNOSIS — F41.1 GENERALIZED ANXIETY DISORDER: ICD-10-CM

## 2023-10-30 RX ORDER — ESCITALOPRAM OXALATE 10 MG/1
TABLET ORAL
Qty: 30 TABLET | Refills: 2 | Status: SHIPPED | OUTPATIENT
Start: 2023-10-30

## 2023-11-08 ENCOUNTER — OFFICE VISIT (OUTPATIENT)
Dept: PSYCHIATRY | Facility: CLINIC | Age: 28
End: 2023-11-08
Payer: COMMERCIAL

## 2023-11-08 VITALS
SYSTOLIC BLOOD PRESSURE: 145 MMHG | HEART RATE: 100 BPM | HEIGHT: 66 IN | BODY MASS INDEX: 30.15 KG/M2 | WEIGHT: 187.6 LBS | DIASTOLIC BLOOD PRESSURE: 86 MMHG

## 2023-11-08 DIAGNOSIS — F90.9 ATTENTION DEFICIT HYPERACTIVITY DISORDER (ADHD), UNSPECIFIED ADHD TYPE: Primary | ICD-10-CM

## 2023-11-08 DIAGNOSIS — Z51.81 THERAPEUTIC DRUG MONITORING: ICD-10-CM

## 2023-11-08 DIAGNOSIS — F41.1 GENERALIZED ANXIETY DISORDER: ICD-10-CM

## 2023-11-08 RX ORDER — DEXTROAMPHETAMINE SACCHARATE, AMPHETAMINE ASPARTATE, DEXTROAMPHETAMINE SULFATE AND AMPHETAMINE SULFATE 2.5; 2.5; 2.5; 2.5 MG/1; MG/1; MG/1; MG/1
10 TABLET ORAL DAILY
Qty: 30 TABLET | Refills: 0 | Status: SHIPPED | OUTPATIENT
Start: 2023-11-08

## 2023-11-08 NOTE — PROGRESS NOTES
"[facility]  Follow-up Visit    Chief Complaint: \"I just started seeing my primary care... I had a doctor and [facility] for the last four years... nobody would see me here... I was kind of far away... I kind of stayed away from going to the doctor... migraines... anxiety... I'm not a lazy person... always had difficulties doing every days tasks and it affects my life; my job, relationships... I still feel like a kid.\"     History of Present Illness: Areli Hernandez is a 28 y.o. female who presents today for follow-up regarding ADHD, LYUBOV. Last seen: 09/15 at which time atomoxetine was started. She presents unaccompanied in no acute distress and engages with me appropriately. Psychotropic regimen perceived to be partially effective. Side-effects per given history: sedation with atomoxetine.    Current treatment regimen includes:   - atomoxetine 40 mg QAM (has stopped taking after about a month)  - escitalopram 10 mg QD    Today the patient feels she's doing fairly. Patient met with therapist, whom recommended she see a psychiatric NP between visits (this was cancelled). Therapist suggested patient may have bipolar disorder; reviewed screening again today - no pathognomonic history of manic or hypomanic episodes. Mood variability, when this does happen, is on the matter of hours to a day or two at most and in reaction to circumstances; no sustained periods of depressive symptoms or elevated mood symptoms. Patient felt the therapist was inferring and interpreting subjective data in ways that were incongruent with her experience as she sees it and as others whom know her see it. Therapist was critical of the patient's medication regimen and follow-up schedule and tended to self-disclose during the first visit. I have recommended the patient see another therapist and the patient would be most comfortable with this route of treatment as well. Anxiety symptoms have been difficult to " "manage despite current interventions, ADHD symptoms are likely implicated as discussed prior. ADHD symptoms are in need of being addressed. Discussed recent UDS results; patient denies having ever used fentanyl - I do find this to be incongruent with the patient's presentation and general functional capacity. Additionally, she's known several young people whom have  while using this agent and has no desire to use in part because of this reason. I will note it's entirely possible this could be a false positive as the patient does take diphenhydramine, which is reported in literature to be a possible source of false-positive results. She is amenable to a subsequent UDS tomorrow. Thought process and content are devoid of overt aberration suggestive of acute daisha/psychosis. The patient denies SI/HI/AVH. There are no overt changes on exam today compared to most recent evaluation.  - contextual changes: saw therapist; has cut back on caffeine and has been using more water  - sleep: generally adequate  - appetite: \"normal\"; no change, but has gained 7 lb since last appointment (this issue started prior to taking escitalopram)    I have counseled the patient with regard to diagnoses and the recommended treatment regimen as documented below: I will be prescribing amphetamine for ADHD. Patient consents to UDS and CSA today. Patient has been made aware of the long-term risks of tachyphylaxis/dependence and uncomfortable withdrawal that may occur with abrupt discontinuation of this agent. I have advised taking medication holidays to mitigate risk of dependence and tolerance and have advised the patient with regard to common psychological dependence that can contribute to perceived diminishment in treatment effectiveness. Patient has been advised to monitor and limit caffeine intake while taking this agent. Patient has been made aware of common SE - diminished appetite, insomnia, hypertension - for which this agent has " propensity. I have specifically reviewed issues related to misuse and diversion with the patient today. Patient acknowledges the diagnoses per my rendered interpretation. Patient demonstrates understanding of potential risks/benefits/side effects associated with this regimen and is amenable to proceed in this fashion.     Psychotherapy  - Time: 27 minutes  - interventions employed: the therapeutic alliance was strengthened to encourage the patient to express their thoughts and feelings freely. Esteem building was enhanced through praise, reassurance, normalizing/challenging, and encouragement as appropriate. Coping skills were enhanced to build distress tolerance skills and emotional regulation. Allowed patient to freely discuss issues without interruption or judgement with unconditional positive regard, active listening skills, and empathy. Provided a safe, confidential environment to facilitate the development of a positive therapeutic relationship and encourage open, honest communication. Assisted patient in processing session content; acknowledged and normalized/addressed, as appropriate, patient’s thoughts, feelings, and concerns by utilizing a person-centered approach in efforts to build appropriate rapport and a positive therapeutic relationship with open and honest communication.   - Diagnoses: see assessment and plan below  - Symptoms: see subjective above  - Goals   - patient: cultivate concentration and focus, mitigate anxiety to bolster functional capacity   - provider: challenge patterns of living conducive to pathology, strengthen defenses, promote problems solving, restore adaptive functioning and provide symptom relief.  - Treatment plan: continue supportive psychotherapy in subsequent appointments to provide symptom relief; see assessment and plan below for additional details:   - iteration: 1   - progress: partial   - (X)illumination, (X)contextualization, (working)detection, (working)development,  "(-)elaboration, (-)refinement  - functional status: fair  - mental status exam: as below  - prognosis: good    Psychiatric History:  Diagnoses: ADHD, anxiety  Outpatient history: last seen at 15 YO (parents did not want her on medication at the time)  Inpatient history: denies  Medication trials: buspirone (ineffective at 7.5 mg BID), may have tried an ADHD medication in the past and cannot recall the name or the effect of the medication  Other treatment modalities: has not done therapy  Presenting regimen: escitalopram 10 mg QD  Self harm: denies  Suicide attempts: denies     Social History:  Residence: lives in a town home with her boyfriend (been together three years, they will probably be engaged soon)  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 I grew up Bahai... until 12 years old... more recently more spiritual stuff with positivity and stuff like that\"  Exercise: denies  Dietary habits: defer  Sleep hygiene: defer  Social habits: no pertinent issues  Sunlight: no concern for under-exposure  Caffeine intake: drinks coffee, soda, energy drinks, occasional tea  Hydration habits: \"I could drink more\", 2 bottles of water daily at least   history: denies, but father was in     Social History     Socioeconomic History    Marital status: Single   Tobacco Use    Smoking status: Never    Smokeless tobacco: Never   Vaping Use    Vaping Use: Every day    Substances: Nicotine    Devices: Refillable tank   Substance and Sexual Activity    Alcohol use: Not Currently     Comment: Couple times during summer    Drug use: Never    Sexual activity: " Yes     Partners: Male     Birth control/protection: I.U.D.     Tobacco use counseling/intervention: N/A, patient does not use tobacco; patient has been counseled with regard to risks of tobacco use.    PHQ-9 Depression Screening  PHQ-9 Total Score:      Little interest or pleasure in doing things?     Feeling down, depressed, or hopeless?     Trouble falling or staying asleep, or sleeping too much?     Feeling tired or having little energy?     Poor appetite or overeating?     Feeling bad about yourself - or that you are a failure or have let yourself or your family down?     Trouble concentrating on things, such as reading the newspaper or watching television?     Moving or speaking so slowly that other people could have noticed? Or the opposite - being so fidgety or restless that you have been moving around a lot more than usual?     Thoughts that you would be better off dead, or of hurting yourself in some way?     PHQ-9 Total Score       Change in PHQ-9 since last measure: N/A (10)    LYUBOV-7       Change in LYUBOV-7 since last measure: N/A (13)    Problem List:  Patient Active Problem List   Diagnosis    Intractable migraine with aura without status migrainosus    Generalized anxiety disorder    Rib pain on right side    Attention deficit hyperactivity disorder (ADHD)    Nicotine dependence due to vaping non-tobacco product    Caffeine dependence     Allergy:   No Known Allergies     Discontinued Medications:  Medications Discontinued During This Encounter   Medication Reason    Rimegepant Sulfate (Nurtec) 75 MG tablet dispersible tablet      Current Medications:   Current Outpatient Medications   Medication Sig Dispense Refill    escitalopram (LEXAPRO) 10 MG tablet TAKE 1/2 TABLET BY MOUTH DAILY FOR 1 WEEK, THEN INCREASE TO 1 TABLET DAILY 30 tablet 2    Levonorgestrel (Mirena, 52 MG,) 20 MCG/DAY intrauterine device IUD 1 each by Intrauterine route.       No current facility-administered medications for this visit.      Past Medical History:  Past Medical History:   Diagnosis Date    ADHD (attention deficit hyperactivity disorder) 2008    Anxiety 2013    Migraines      Past Surgical History:  History reviewed. No pertinent surgical history.    Mental Status Exam:   Appearance: well-groomed, sits upright, age-appropriate, above average habitus  Behavior: calm, cooperative, appropriate in demeanor, appropriate eye-contact  Mood/affect: euthymic / mood-congruent and appropriate in both range and amplitude  Speech: loquacious, otherwise within expected variance; appropriate rate, appropriate rhythm, appropriate tone; non-pressured  Thought Process: linear, goal-directed; no FOI or VIKI; abstraction intact  Thought Content: coherent, devoid of overt delusions/perceptual disturbances  SI/HI: denies both SI and HI; exhibits future-orientation, self-advocates appropriately, no regular self-harm, no appreciable intent  Memory: no overt deficits  Orientation: mildly distractible  Concentration: appropriate during interview  Intellectual capacity: presumptively average  Insight: fair by given history/exam  Judgment: appropriate by given history/exam  Psychomotor: fidgets  Gait: WNL    Review of Systems:  Review of Systems   Constitutional:  Negative for activity change, appetite change and unexpected weight change.   HENT:  Negative for drooling.    Eyes:  Negative for visual disturbance.   Respiratory:  Positive for chest tightness. Negative for shortness of breath.    Cardiovascular:  Negative for chest pain and palpitations.   Gastrointestinal:  Negative for abdominal pain, diarrhea, nausea and vomiting.   Endocrine: Negative for cold intolerance and heat intolerance.   Genitourinary:  Negative for difficulty urinating and frequency.   Musculoskeletal:  Negative for myalgias and neck stiffness.   Skin:  Negative for rash.   Neurological:  Negative for dizziness, tremors, seizures and light-headedness.     Vital Signs:   /86    "Pulse 100   Ht 167.6 cm (66\")   Wt 85.1 kg (187 lb 9.6 oz)   BMI 30.28 kg/m²      Lab Results:   Telephone on 10/09/2023   Component Date Value Ref Range Status    Amphet/Methamphet, Screen 10/12/2023 Negative  Negative Final    Barbiturates Screen, Urine 10/12/2023 Negative  Negative Final    Benzodiazepine Screen, Urine 10/12/2023 Negative  Negative Final    Cocaine Screen, Urine 10/12/2023 Negative  Negative Final    Opiate Screen 10/12/2023 Negative  Negative Final    THC, Screen, Urine 10/12/2023 Negative  Negative Final    Methadone Screen, Urine 10/12/2023 Negative  Negative Final    Oxycodone Screen, Urine 10/12/2023 Negative  Negative Final    Fentanyl, Urine 10/12/2023 Positive (A)  Negative Final   Admission on 09/01/2023, Discharged on 09/01/2023   Component Date Value Ref Range Status    COVID19 09/01/2023 Not Detected  Not Detected - Ref. Range Final    POC Influenza A, Molecular 09/01/2023 Not Detected  Negative / Not Detected Final    POC Influenza B, Molecular 09/01/2023 Not Detected  Negative / Not Detected Final    Internal Control 09/01/2023 Passed  Passed Final    Lot Number 09/01/2023 [id number]   Final    Expiration Date 09/01/2023 4/30/2024   Final    Rapid Strep A Screen 09/01/2023 Negative  Negative, VALID, INVALID, Not Performed Final    Internal Control 09/01/2023 Passed  Passed Final    Lot Number 09/01/2023 [id number]   Final    Expiration Date 09/01/2023 111,924   Final     EKG Results:  No orders to display     Imaging Results:  MRI Brain With & Without Contrast  Result Date: 7/18/2023    1. No acute intracranial abnormality 2. Mild mucosal inflammatory thickening in the bilateral ethmoid sinuses      Robert Vicente M.D.    ASSESSMENT AND PLAN:    ICD-10-CM ICD-9-CM   1. Attention deficit hyperactivity disorder (ADHD), unspecified ADHD type  F90.9 314.01   2. Generalized anxiety disorder  F41.1 300.02     28 y.o. female who presents today for follow-up regarding ADHD, LYUBOV. We have " discussed the interval history and the treatment plan below, including potential R/B/SE of the recommended regimen of which the patient demonstrates understanding. Patient is agreeable to call 911 or go to the nearest ER should she become concerned for her own safety and/or the safety of those around her. There are no overt indices of acute daisha/psychosis on exam today. GLORIA reviewed and as expected.    Medication regimen: begin amphetamine 10 mg QD, continue escitalopram for the time being; patient is advised not to misuse prescribed medications or to use them with any exogenous substances that aren't disclosed to this provider as they may interact with the regimen to the patient's detriment.   Risk Assessment: protracted risk is low, imminent risk is low - no interval change. Do note that this is subject to change with the Spiritism of new stressors, treatment non-adherence, use of substances, and/or new medical ails.   Monitoring: reviewed labs/imaging as populated above; second UDS ordered  Therapy: patient will call [facility]  Follow-up: 6 weeks  Communications: N/A    TREATMENT PLAN/GOALS: challenge patterns of living conducive to symptom burden, implement recommended regimen as above with augmentative, intermittent supportive psychotherapy to reduce symptom burden. Patient acknowledged and verbally consented to continue treatment. The importance of adherence to the recommended treatment and interval follow-up appointments was again emphasized today: patient has good treatment adherence per given history. Patient was today reminded to limit daily caffeine intake, hydrate appropriately, eat healthy and nutritious foods, engage sleep hygiene measures, engage appropriate exposure to sunlight, engage with hobbies in balance with life necessities, and exercise appropriate to their capacity to do so.     Billing: This encounter is of moderate complexity based on number/complexity of problems addressed today  and risk of complications/morbidity: 2+ stable chronic illnesses and prescription management. Additionally, I provided 27 minutes of dedicated psychotherapy to the patient as documented above.    Parts of this note are electronic transcriptions/translations of spoken language to printed text using the Dragon Dictation system.    Electronically signed by Harley Hernandez MD, 11/08/23, 8908

## 2023-11-09 ENCOUNTER — TELEPHONE (OUTPATIENT)
Dept: PSYCHIATRY | Facility: CLINIC | Age: 28
End: 2023-11-09
Payer: COMMERCIAL

## 2023-11-13 ENCOUNTER — LAB (OUTPATIENT)
Dept: LAB | Facility: HOSPITAL | Age: 28
End: 2023-11-13
Payer: COMMERCIAL

## 2023-11-13 LAB
AMPHET+METHAMPHET UR QL: NEGATIVE
BARBITURATES UR QL SCN: NEGATIVE
BENZODIAZ UR QL SCN: NEGATIVE
CANNABINOIDS SERPL QL: NEGATIVE
COCAINE UR QL: NEGATIVE
FENTANYL UR-MCNC: NEGATIVE NG/ML
METHADONE UR QL SCN: NEGATIVE
OPIATES UR QL: NEGATIVE
OXYCODONE UR QL SCN: NEGATIVE

## 2023-11-13 PROCEDURE — 80307 DRUG TEST PRSMV CHEM ANLYZR: CPT | Performed by: PSYCHIATRY & NEUROLOGY

## 2023-11-18 ENCOUNTER — HOSPITAL ENCOUNTER (EMERGENCY)
Facility: HOSPITAL | Age: 28
Discharge: HOME | End: 2023-11-18
Attending: EMERGENCY MEDICINE
Payer: COMMERCIAL

## 2023-11-18 VITALS
HEIGHT: 66 IN | HEART RATE: 73 BPM | RESPIRATION RATE: 16 BRPM | BODY MASS INDEX: 29.3 KG/M2 | SYSTOLIC BLOOD PRESSURE: 136 MMHG | WEIGHT: 182.32 LBS | DIASTOLIC BLOOD PRESSURE: 84 MMHG | TEMPERATURE: 99.3 F | OXYGEN SATURATION: 99 %

## 2023-11-18 DIAGNOSIS — N39.0 ACUTE UTI: Primary | ICD-10-CM

## 2023-11-18 LAB
ALBUMIN SERPL-MCNC: 5 G/DL (ref 3.5–5.2)
ALBUMIN/GLOB SERPL: 1.7 G/DL
ALP SERPL-CCNC: 68 U/L (ref 39–117)
ALT SERPL W P-5'-P-CCNC: 18 U/L (ref 1–33)
ANION GAP SERPL CALCULATED.3IONS-SCNC: 13.4 MMOL/L (ref 5–15)
AST SERPL-CCNC: 15 U/L (ref 1–32)
BACTERIA UR QL AUTO: ABNORMAL /HPF
BASOPHILS # BLD AUTO: 0.03 10*3/MM3 (ref 0–0.2)
BASOPHILS NFR BLD AUTO: 0.4 % (ref 0–1.5)
BILIRUB SERPL-MCNC: 0.6 MG/DL (ref 0–1.2)
BILIRUB UR QL STRIP: NEGATIVE
BUN SERPL-MCNC: 9 MG/DL (ref 6–20)
BUN/CREAT SERPL: 8.8 (ref 7–25)
CALCIUM SPEC-SCNC: 9.3 MG/DL (ref 8.6–10.5)
CHLORIDE SERPL-SCNC: 101 MMOL/L (ref 98–107)
CLARITY UR: ABNORMAL
CO2 SERPL-SCNC: 25.6 MMOL/L (ref 22–29)
COLOR UR: YELLOW
CREAT SERPL-MCNC: 1.02 MG/DL (ref 0.57–1)
DEPRECATED RDW RBC AUTO: 41.3 FL (ref 37–54)
EGFRCR SERPLBLD CKD-EPI 2021: 78 ML/MIN/1.73
EOSINOPHIL # BLD AUTO: 0 10*3/MM3 (ref 0–0.4)
EOSINOPHIL NFR BLD AUTO: 0 % (ref 0.3–6.2)
ERYTHROCYTE [DISTWIDTH] IN BLOOD BY AUTOMATED COUNT: 13.2 % (ref 12.3–15.4)
GLOBULIN UR ELPH-MCNC: 2.9 GM/DL
GLUCOSE SERPL-MCNC: 107 MG/DL (ref 65–99)
GLUCOSE UR STRIP-MCNC: NEGATIVE MG/DL
HCG INTACT+B SERPL-ACNC: <0.5 MIU/ML
HCT VFR BLD AUTO: 36.3 % (ref 34–46.6)
HGB BLD-MCNC: 12.2 G/DL (ref 12–15.9)
HGB UR QL STRIP.AUTO: NEGATIVE
HYALINE CASTS UR QL AUTO: ABNORMAL /LPF
IMM GRANULOCYTES # BLD AUTO: 0.01 10*3/MM3 (ref 0–0.05)
IMM GRANULOCYTES NFR BLD AUTO: 0.1 % (ref 0–0.5)
KETONES UR QL STRIP: NEGATIVE
LEUKOCYTE ESTERASE UR QL STRIP.AUTO: ABNORMAL
LYMPHOCYTES # BLD AUTO: 1.63 10*3/MM3 (ref 0.7–3.1)
LYMPHOCYTES NFR BLD AUTO: 21.2 % (ref 19.6–45.3)
MCH RBC QN AUTO: 29 PG (ref 26.6–33)
MCHC RBC AUTO-ENTMCNC: 33.6 G/DL (ref 31.5–35.7)
MCV RBC AUTO: 86.2 FL (ref 79–97)
MONOCYTES # BLD AUTO: 0.55 10*3/MM3 (ref 0.1–0.9)
MONOCYTES NFR BLD AUTO: 7.2 % (ref 5–12)
NEUTROPHILS NFR BLD AUTO: 5.46 10*3/MM3 (ref 1.7–7)
NEUTROPHILS NFR BLD AUTO: 71.1 % (ref 42.7–76)
NITRITE UR QL STRIP: NEGATIVE
NRBC BLD AUTO-RTO: 0 /100 WBC (ref 0–0.2)
PH UR STRIP.AUTO: 6 [PH] (ref 5–8)
PLATELET # BLD AUTO: 306 10*3/MM3 (ref 140–450)
PMV BLD AUTO: 10.3 FL (ref 6–12)
POTASSIUM SERPL-SCNC: 3.4 MMOL/L (ref 3.5–5.2)
PROT SERPL-MCNC: 7.9 G/DL (ref 6–8.5)
PROT UR QL STRIP: NEGATIVE
RBC # BLD AUTO: 4.21 10*6/MM3 (ref 3.77–5.28)
RBC # UR STRIP: ABNORMAL /HPF
REF LAB TEST METHOD: ABNORMAL
SODIUM SERPL-SCNC: 140 MMOL/L (ref 136–145)
SP GR UR STRIP: 1.01 (ref 1–1.03)
SQUAMOUS #/AREA URNS HPF: ABNORMAL /HPF
UROBILINOGEN UR QL STRIP: ABNORMAL
WBC # UR STRIP: ABNORMAL /HPF
WBC NRBC COR # BLD AUTO: 7.68 10*3/MM3 (ref 3.4–10.8)

## 2023-11-18 PROCEDURE — 87086 URINE CULTURE/COLONY COUNT: CPT | Performed by: EMERGENCY MEDICINE

## 2023-11-18 PROCEDURE — 99283 EMERGENCY DEPT VISIT LOW MDM: CPT

## 2023-11-18 PROCEDURE — 80053 COMPREHEN METABOLIC PANEL: CPT | Performed by: NURSE PRACTITIONER

## 2023-11-18 PROCEDURE — 96365 THER/PROPH/DIAG IV INF INIT: CPT

## 2023-11-18 PROCEDURE — 85025 COMPLETE CBC W/AUTO DIFF WBC: CPT | Performed by: NURSE PRACTITIONER

## 2023-11-18 PROCEDURE — 25010000002 CEFTRIAXONE PER 250 MG: Performed by: NURSE PRACTITIONER

## 2023-11-18 PROCEDURE — 96375 TX/PRO/DX INJ NEW DRUG ADDON: CPT

## 2023-11-18 PROCEDURE — 81001 URINALYSIS AUTO W/SCOPE: CPT

## 2023-11-18 PROCEDURE — 25810000003 SODIUM CHLORIDE 0.9 % SOLUTION: Performed by: NURSE PRACTITIONER

## 2023-11-18 PROCEDURE — 84702 CHORIONIC GONADOTROPIN TEST: CPT | Performed by: NURSE PRACTITIONER

## 2023-11-18 PROCEDURE — 36415 COLL VENOUS BLD VENIPUNCTURE: CPT

## 2023-11-18 PROCEDURE — 25010000002 KETOROLAC TROMETHAMINE PER 15 MG: Performed by: NURSE PRACTITIONER

## 2023-11-18 RX ORDER — KETOROLAC TROMETHAMINE 15 MG/ML
30 INJECTION, SOLUTION INTRAMUSCULAR; INTRAVENOUS ONCE
Status: COMPLETED | OUTPATIENT
Start: 2023-11-18 | End: 2023-11-18

## 2023-11-18 RX ORDER — CEFTRIAXONE SODIUM 1 G/50ML
1000 INJECTION, SOLUTION INTRAVENOUS ONCE
Status: COMPLETED | OUTPATIENT
Start: 2023-11-18 | End: 2023-11-18

## 2023-11-18 RX ORDER — KETOROLAC TROMETHAMINE 10 MG/1
10 TABLET, FILM COATED ORAL EVERY 6 HOURS PRN
Qty: 15 TABLET | Refills: 0 | Status: SHIPPED | OUTPATIENT
Start: 2023-11-18

## 2023-11-18 RX ORDER — ONDANSETRON 4 MG/1
4 TABLET, ORALLY DISINTEGRATING ORAL EVERY 8 HOURS PRN
Qty: 15 TABLET | Refills: 0 | Status: SHIPPED | OUTPATIENT
Start: 2023-11-18

## 2023-11-18 RX ORDER — NITROFURANTOIN 25; 75 MG/1; MG/1
100 CAPSULE ORAL 2 TIMES DAILY
Qty: 14 CAPSULE | Refills: 0 | Status: SHIPPED | OUTPATIENT
Start: 2023-11-18 | End: 2023-11-25

## 2023-11-18 RX ADMIN — CEFTRIAXONE SODIUM 1000 MG: 1 INJECTION, SOLUTION INTRAVENOUS at 11:51

## 2023-11-18 RX ADMIN — KETOROLAC TROMETHAMINE 30 MG: 15 INJECTION, SOLUTION INTRAMUSCULAR; INTRAVENOUS at 12:52

## 2023-11-18 RX ADMIN — SODIUM CHLORIDE 1000 ML: 9 INJECTION, SOLUTION INTRAVENOUS at 11:51

## 2023-11-18 NOTE — Clinical Note
[facility] EMERGENCY ROOM  [address]  [place] [address]  Phone: [phone]    Areli Hernandez was seen and treated in our emergency department on 11/18/2023.  She may return to work on 11/24/2023.         Thank you for choosing [facility].    Arnoldo Flores APRN

## 2023-11-18 NOTE — ED PROVIDER NOTES
Time: 11:13 AM EST  Date of encounter:  11/18/2023  Independent Historian/Clinical History and Information was obtained by:   Patient    History is limited by: N/A    Chief Complaint: Urinary tract infection      History of Present Illness:  Patient is a 28 y.o. year old female who presents to the emergency department for evaluation of possible UTI.  Patient states for the last 3 days has had urinary frequency, dysuria, bladder spasms and lower back pain.  Patient states that she is prone to urinary tract infections and symptoms are consistent.  Patient states that she has not been drinking fluids for the last 3 days and was feeling lightheaded and had a blood pressure at home with a systolic of 98.  Patient denies any additional symptoms and or concerns at this time.    HPI    Patient Care Team  Primary Care Provider: Provider, Lara Known    Past Medical History:     No Known Allergies  Past Medical History:   Diagnosis Date    ADHD (attention deficit hyperactivity disorder) 2008    Anxiety 2013    Drug abuse     Migraines      History reviewed. No pertinent surgical history.  Family History   Problem Relation Age of Onset    Cancer Mother         Breast cancer       Home Medications:  Prior to Admission medications    Medication Sig Start Date End Date Taking? Authorizing Provider   amphetamine-dextroamphetamine (ADDERALL) 10 MG tablet Take 1 tablet by mouth Daily. 11/8/23   Harley Hernandez MD   BACLOFEN PO Take  by mouth.    Babs Blanco MD   escitalopram (LEXAPRO) 10 MG tablet TAKE 1/2 TABLET BY MOUTH DAILY FOR 1 WEEK, THEN INCREASE TO 1 TABLET DAILY 10/30/23   Rema Wills APRN   IBUPROFEN PO Take  by mouth.    Babs Blanco MD   Levonorgestrel (Mirena, 52 MG,) 20 MCG/DAY intrauterine device IUD 1 each by Intrauterine route.    Babs Blanco MD   Sulfamethoxazole-Trimethoprim (BACTRIM PO) Take  by mouth. Started 11/17/23 PM    Babs Blanco MD        Social History:  "  Social History     Tobacco Use    Smoking status: Never    Smokeless tobacco: Never   Vaping Use    Vaping Use: Every day    Substances: Nicotine    Devices: Refillable tank   Substance Use Topics    Alcohol use: Not Currently     Comment: Couple times during summer    Drug use: Not Currently         Review of Systems:  Review of Systems   Constitutional:  Negative for chills and fever.   HENT:  Negative for congestion, ear pain and sore throat.    Eyes:  Negative for pain.   Respiratory:  Negative for cough, chest tightness and shortness of breath.    Cardiovascular:  Negative for chest pain.   Gastrointestinal:  Negative for abdominal pain, diarrhea, nausea and vomiting.        \"Bladder pain\"   Genitourinary:  Positive for dysuria, frequency and urgency. Negative for flank pain and hematuria.   Musculoskeletal:  Positive for back pain. Negative for joint swelling.   Skin:  Negative for pallor.   Neurological:  Negative for seizures and headaches.   All other systems reviewed and are negative.       Physical Exam:  /84   Pulse 73   Temp 99.3 °F (37.4 °C) (Oral)   Resp 16   Ht 167.6 cm (66\")   Wt 82.7 kg (182 lb 5.1 oz)   SpO2 99%   BMI 29.43 kg/m²     Physical Exam  Vitals and nursing note reviewed.   Constitutional:       General: She is not in acute distress.     Appearance: Normal appearance. She is not toxic-appearing.   HENT:      Head: Normocephalic and atraumatic.      Mouth/Throat:      Mouth: Mucous membranes are moist.   Eyes:      General: No scleral icterus.  Cardiovascular:      Rate and Rhythm: Normal rate and regular rhythm.      Pulses: Normal pulses.      Heart sounds: Normal heart sounds.   Pulmonary:      Effort: Pulmonary effort is normal. No respiratory distress.      Breath sounds: Normal breath sounds.   Abdominal:      General: Abdomen is flat. Bowel sounds are normal.      Palpations: Abdomen is soft.      Tenderness: There is no abdominal tenderness.   Musculoskeletal:       "   General: Normal range of motion.      Cervical back: Normal range of motion and neck supple.   Skin:     General: Skin is warm and dry.   Neurological:      General: No focal deficit present.      Mental Status: She is alert and oriented to person, place, and time. Mental status is at baseline.   Psychiatric:         Mood and Affect: Mood normal.         Behavior: Behavior normal.         Thought Content: Thought content normal.         Judgment: Judgment normal.                  Procedures:  Procedures      Medical Decision Making:      Comorbidities that affect care:    None    External Notes reviewed:    None      The following orders were placed and all results were independently analyzed by me:  Orders Placed This Encounter   Procedures    Urine Culture - Urine,    Urinalysis With Culture If Indicated -    Urinalysis, Microscopic Only - Urine, Clean Catch    Comprehensive Metabolic Panel    CBC Auto Differential    hCG, Quantitative, Pregnancy    CBC & Differential       Medications Given in the Emergency Department:  Medications   sodium chloride 0.9 % bolus 1,000 mL (0 mL Intravenous Stopped 11/18/23 1248)   cefTRIAXone (ROCEPHIN) IVPB 1,000 mg (0 mg Intravenous Stopped 11/18/23 1248)   ketorolac (TORADOL) injection 30 mg (30 mg Intravenous Given 11/18/23 1252)        ED Course:         Labs:    Lab Results (last 24 hours)       Procedure Component Value Units Date/Time    Urinalysis With Culture If Indicated - Urine, Clean Catch [[id number]]  (Abnormal) Collected: 11/18/23 1005    Specimen: Urine, Clean Catch Updated: 11/18/23 1026     Color, UA Yellow     Appearance, UA Cloudy     pH, UA 6.0     Specific Gravity, UA 1.015     Glucose, UA Negative     Ketones, UA Negative     Bilirubin, UA Negative     Blood, UA Negative     Protein, UA Negative     Leuk Esterase, UA Moderate (2+)     Nitrite, UA Negative     Urobilinogen, UA 1.0 E.U./dL    Narrative:      In absence of clinical symptoms, the presence of  pyuria, bacteria, and/or nitrites on the urinalysis result does not correlate with infection.    Urinalysis, Microscopic Only - Urine, Clean Catch [[id number]]  (Abnormal) Collected: 11/18/23 1005    Specimen: Urine, Clean Catch Updated: 11/18/23 1026     RBC, UA None Seen /HPF      WBC, UA 6-10 /HPF      Bacteria, UA Trace /HPF      Squamous Epithelial Cells, UA 13-20 /HPF      Hyaline Casts, UA None Seen /LPF      Methodology Automated Microscopy    Urine Culture - Urine, Urine, Clean Catch [[id number]] Collected: 11/18/23 1005    Specimen: Urine, Clean Catch Updated: 11/18/23 1026    CBC & Differential [[id number]]  (Abnormal) Collected: 11/18/23 1140    Specimen: Blood Updated: 11/18/23 1148    Narrative:      The following orders were created for panel order CBC & Differential.  Procedure                               Abnormality         Status                     ---------                               -----------         ------                     CBC Auto Differential[[id number]]        Abnormal            Final result                 Please view results for these tests on the individual orders.    Comprehensive Metabolic Panel [[id number]]  (Abnormal) Collected: 11/18/23 1140    Specimen: Blood Updated: 11/18/23 1212     Glucose 107 mg/dL      BUN 9 mg/dL      Creatinine 1.02 mg/dL      Sodium 140 mmol/L      Potassium 3.4 mmol/L      Chloride 101 mmol/L      CO2 25.6 mmol/L      Calcium 9.3 mg/dL      Total Protein 7.9 g/dL      Albumin 5.0 g/dL      ALT (SGPT) 18 U/L      AST (SGOT) 15 U/L      Alkaline Phosphatase 68 U/L      Total Bilirubin 0.6 mg/dL      Globulin 2.9 gm/dL      A/G Ratio 1.7 g/dL      BUN/Creatinine Ratio 8.8     Anion Gap 13.4 mmol/L      eGFR 78.0 mL/min/1.73     Narrative:      GFR Normal >60  Chronic Kidney Disease <60  Kidney Failure <15      CBC Auto Differential [[id number]]  (Abnormal) Collected: 11/18/23 1140    Specimen: Blood Updated: 11/18/23 1148     WBC 7.68 10*3/mm3      RBC  4.21 10*6/mm3      Hemoglobin 12.2 g/dL      Hematocrit 36.3 %      MCV 86.2 fL      MCH 29.0 pg      MCHC 33.6 g/dL      RDW 13.2 %      RDW-SD 41.3 fl      MPV 10.3 fL      Platelets 306 10*3/mm3      Neutrophil % 71.1 %      Lymphocyte % 21.2 %      Monocyte % 7.2 %      Eosinophil % 0.0 %      Basophil % 0.4 %      Immature Grans % 0.1 %      Neutrophils, Absolute 5.46 10*3/mm3      Lymphocytes, Absolute 1.63 10*3/mm3      Monocytes, Absolute 0.55 10*3/mm3      Eosinophils, Absolute 0.00 10*3/mm3      Basophils, Absolute 0.03 10*3/mm3      Immature Grans, Absolute 0.01 10*3/mm3      nRBC 0.0 /100 WBC     hCG, Quantitative, Pregnancy [[id number]] Collected: 11/18/23 1140    Specimen: Blood Updated: 11/18/23 1206     HCG Quantitative <0.50 mIU/mL     Narrative:      HCG Ranges by Gestational Age    Females - non-pregnant premenopausal   </= 1mIU/mL HCG  Females - postmenopausal               </= 7mIU/mL HCG    3 Weeks       5.4   -      72 mIU/mL  4 Weeks      10.2   -     708 mIU/mL  5 Weeks       217   -   8,245 mIU/mL  6 Weeks       152   -  32,177 mIU/mL  7 Weeks     4,059   - 153,767 mIU/mL  8 Weeks    31,366   - 149,094 mIU/mL  9 Weeks    59,109   - 135,901 mIU/mL  10 Weeks   44,186   - 170,409 mIU/mL  12 Weeks   27,107   - 201,615 mIU/mL  14 Weeks   24,302   -  93,646 mIU/mL  15 Weeks   12,540   -  69,747 mIU/mL  16 Weeks    8,904   -  55,332 mIU/mL  17 Weeks    8,240   -  51,793 mIU/mL  18 Weeks    9,649   -  55,271 mIU/mL               Imaging:    No Radiology Exams Resulted Within Past 24 Hours      Differential Diagnosis and Discussion:    Back Pain: The patient presents with back pain. My differential diagnosis includes but is not limited to acute spinal epidural abscess, acute spinal epidural bleed, cauda equina syndrome, abdominal aortic aneurysm, aortic dissection, kidney stone, pyelonephritis, musculoskeletal back pain, spinal fracture, and osteoarthritis.   Flank Pain: Differential diagnosis  includes but is not limited to kidney stones, pyelonephritis, musculoskeletal disorders, renal infarction, urinary tract infection, hydronephrosis, radiculopathy, aortic aneurysm, renal cell carcinoma.  Hematuria: Differential diagnosis includes but is not limited to medications, coagulopathy, glomerulonephritis, nephritis, neoplasm, vascular abnormalities, cystitis, urethritis, neoplasms of the bladder, and autoimmune disorders.    All labs were reviewed and interpreted by me.    MDM     Amount and/or Complexity of Data Reviewed  Clinical lab tests: reviewed                 Patient Care Considerations:          Consultants/Shared Management Plan:    None    Social Determinants of Health:    Patient is independent, reliable, and has access to care.       Disposition and Care Coordination:    Discharged: The patient is suitable and stable for discharge with no need for consideration of observation or admission.    I have explained the patient´s condition, diagnoses and treatment plan based on the information available to me at this time. I have answered questions and addressed any concerns. The patient has a good  understanding of the patient´s diagnosis, condition, and treatment plan as can be expected at this point. The vital signs have been stable. The patient´s condition is stable and appropriate for discharge from the emergency department.      The patient will pursue further outpatient evaluation with the primary care physician or other designated or consulting physician as outlined in the discharge instructions. They are agreeable to this plan of care and follow-up instructions have been explained in detail. The patient has received these instructions in written format and have expressed an understanding of the discharge instructions. The patient is aware that any significant change in condition or worsening of symptoms should prompt an immediate return to this or the closest emergency department or call to  911.  I have explained discharge medications and the need for follow up with the patient/caretakers. This was also printed in the discharge instructions. Patient was discharged with the following medications and follow up:      Medication List        New Prescriptions      ketorolac 10 MG tablet  Commonly known as: TORADOL  Take 1 tablet by mouth Every 6 (Six) Hours As Needed for Moderate Pain.     nitrofurantoin (macrocrystal-monohydrate) 100 MG capsule  Commonly known as: MACROBID  Take 1 capsule by mouth 2 (Two) Times a Day for 7 days.     ondansetron ODT 4 MG disintegrating tablet  Commonly known as: ZOFRAN-ODT  Place 1 tablet on the tongue Every 8 (Eight) Hours As Needed for Nausea or Vomiting for up to 15 doses.            Stop      BACLOFEN PO     BACTRIM PO     IBUPROFEN PO               Where to Get Your Medications        These medications were sent to [id number] - [place], [place] - [address] AT [facility] - [phone]  - [phone] FX  [address] [facility] [address]      Phone: [phone]   ketorolac 10 MG tablet  nitrofurantoin (macrocrystal-monohydrate) 100 MG capsule  ondansetron ODT 4 MG disintegrating tablet      Provider, No Known  [place] [id number]    In 3 days      Jessy Ly MD  [address] [place] [address]  [phone]      Call for next available appointment if not improving.       Final diagnoses:   Acute UTI        ED Disposition       ED Disposition   Discharge    Condition   Stable    Comment   --               This medical record created using voice recognition software.             Arnoldo Flores, APRN  11/18/23 1601

## 2023-11-19 LAB — BACTERIA SPEC AEROBE CULT: NO GROWTH

## 2023-11-22 ENCOUNTER — APPOINTMENT (OUTPATIENT)
Dept: GENERAL RADIOLOGY | Facility: HOSPITAL | Age: 28
End: 2023-11-22
Payer: COMMERCIAL

## 2023-11-22 ENCOUNTER — HOSPITAL ENCOUNTER (EMERGENCY)
Facility: HOSPITAL | Age: 28
Discharge: HOME | End: 2023-11-22
Attending: EMERGENCY MEDICINE | Admitting: EMERGENCY MEDICINE
Payer: COMMERCIAL

## 2023-11-22 VITALS
OXYGEN SATURATION: 99 % | WEIGHT: 184.5 LBS | RESPIRATION RATE: 20 BRPM | HEIGHT: 67 IN | HEART RATE: 82 BPM | SYSTOLIC BLOOD PRESSURE: 131 MMHG | DIASTOLIC BLOOD PRESSURE: 84 MMHG | TEMPERATURE: 98.9 F | BODY MASS INDEX: 28.96 KG/M2

## 2023-11-22 DIAGNOSIS — E87.6 HYPOKALEMIA: ICD-10-CM

## 2023-11-22 DIAGNOSIS — R00.2 PALPITATIONS: Primary | ICD-10-CM

## 2023-11-22 LAB
ALBUMIN SERPL-MCNC: 5 G/DL (ref 3.5–5.2)
ALBUMIN/GLOB SERPL: 1.6 G/DL
ALP SERPL-CCNC: 74 U/L (ref 39–117)
ALT SERPL W P-5'-P-CCNC: 18 U/L (ref 1–33)
ANION GAP SERPL CALCULATED.3IONS-SCNC: 16.5 MMOL/L (ref 5–15)
AST SERPL-CCNC: 19 U/L (ref 1–32)
BASOPHILS # BLD AUTO: 0.03 10*3/MM3 (ref 0–0.2)
BASOPHILS NFR BLD AUTO: 0.4 % (ref 0–1.5)
BILIRUB SERPL-MCNC: 0.6 MG/DL (ref 0–1.2)
BUN SERPL-MCNC: 8 MG/DL (ref 6–20)
BUN/CREAT SERPL: 8.9 (ref 7–25)
CALCIUM SPEC-SCNC: 9.5 MG/DL (ref 8.6–10.5)
CHLORIDE SERPL-SCNC: 99 MMOL/L (ref 98–107)
CO2 SERPL-SCNC: 25.5 MMOL/L (ref 22–29)
CREAT SERPL-MCNC: 0.9 MG/DL (ref 0.57–1)
DEPRECATED RDW RBC AUTO: 43.1 FL (ref 37–54)
EGFRCR SERPLBLD CKD-EPI 2021: 90.6 ML/MIN/1.73
EOSINOPHIL # BLD AUTO: 0.04 10*3/MM3 (ref 0–0.4)
EOSINOPHIL NFR BLD AUTO: 0.5 % (ref 0.3–6.2)
ERYTHROCYTE [DISTWIDTH] IN BLOOD BY AUTOMATED COUNT: 13.3 % (ref 12.3–15.4)
GLOBULIN UR ELPH-MCNC: 3.1 GM/DL
GLUCOSE SERPL-MCNC: 127 MG/DL (ref 65–99)
HCT VFR BLD AUTO: 36 % (ref 34–46.6)
HGB BLD-MCNC: 11.9 G/DL (ref 12–15.9)
HOLD SPECIMEN: NORMAL
HOLD SPECIMEN: NORMAL
IMM GRANULOCYTES # BLD AUTO: 0.01 10*3/MM3 (ref 0–0.05)
IMM GRANULOCYTES NFR BLD AUTO: 0.1 % (ref 0–0.5)
LYMPHOCYTES # BLD AUTO: 2.16 10*3/MM3 (ref 0.7–3.1)
LYMPHOCYTES NFR BLD AUTO: 29.5 % (ref 19.6–45.3)
MAGNESIUM SERPL-MCNC: 2.4 MG/DL (ref 1.6–2.6)
MCH RBC QN AUTO: 29 PG (ref 26.6–33)
MCHC RBC AUTO-ENTMCNC: 33.1 G/DL (ref 31.5–35.7)
MCV RBC AUTO: 87.8 FL (ref 79–97)
MONOCYTES # BLD AUTO: 0.5 10*3/MM3 (ref 0.1–0.9)
MONOCYTES NFR BLD AUTO: 6.8 % (ref 5–12)
NEUTROPHILS NFR BLD AUTO: 4.58 10*3/MM3 (ref 1.7–7)
NEUTROPHILS NFR BLD AUTO: 62.7 % (ref 42.7–76)
NRBC BLD AUTO-RTO: 0 /100 WBC (ref 0–0.2)
PLATELET # BLD AUTO: 322 10*3/MM3 (ref 140–450)
PMV BLD AUTO: 10.3 FL (ref 6–12)
POTASSIUM SERPL-SCNC: 3.1 MMOL/L (ref 3.5–5.2)
PROT SERPL-MCNC: 8.1 G/DL (ref 6–8.5)
QT INTERVAL: 384 MS
QTC INTERVAL: 476 MS
RBC # BLD AUTO: 4.1 10*6/MM3 (ref 3.77–5.28)
SODIUM SERPL-SCNC: 141 MMOL/L (ref 136–145)
TROPONIN T SERPL HS-MCNC: <6 NG/L
TSH SERPL DL<=0.05 MIU/L-ACNC: 1.18 UIU/ML (ref 0.27–4.2)
WBC NRBC COR # BLD AUTO: 7.32 10*3/MM3 (ref 3.4–10.8)
WHOLE BLOOD HOLD COAG: NORMAL
WHOLE BLOOD HOLD SPECIMEN: NORMAL

## 2023-11-22 PROCEDURE — 36415 COLL VENOUS BLD VENIPUNCTURE: CPT

## 2023-11-22 PROCEDURE — 71045 X-RAY EXAM CHEST 1 VIEW: CPT

## 2023-11-22 PROCEDURE — 84443 ASSAY THYROID STIM HORMONE: CPT

## 2023-11-22 PROCEDURE — 83735 ASSAY OF MAGNESIUM: CPT

## 2023-11-22 PROCEDURE — 99284 EMERGENCY DEPT VISIT MOD MDM: CPT

## 2023-11-22 PROCEDURE — 80053 COMPREHEN METABOLIC PANEL: CPT

## 2023-11-22 PROCEDURE — 85025 COMPLETE CBC W/AUTO DIFF WBC: CPT

## 2023-11-22 PROCEDURE — 84484 ASSAY OF TROPONIN QUANT: CPT

## 2023-11-22 PROCEDURE — 93005 ELECTROCARDIOGRAM TRACING: CPT | Performed by: EMERGENCY MEDICINE

## 2023-11-22 PROCEDURE — 93005 ELECTROCARDIOGRAM TRACING: CPT

## 2023-11-22 RX ORDER — ONDANSETRON HYDROCHLORIDE 8 MG/1
TABLET, FILM COATED ORAL
COMMUNITY
Start: 2023-11-15

## 2023-11-22 RX ORDER — NALOXONE HYDROCHLORIDE 4 MG/.1ML
SPRAY NASAL
COMMUNITY
Start: 2023-11-15

## 2023-11-22 RX ORDER — SODIUM CHLORIDE 0.9 % (FLUSH) 0.9 %
10 SYRINGE (ML) INJECTION AS NEEDED
Status: DISCONTINUED | OUTPATIENT
Start: 2023-11-22 | End: 2023-11-22 | Stop reason: HOSPADM

## 2023-11-22 RX ORDER — POTASSIUM CHLORIDE 750 MG/1
40 CAPSULE, EXTENDED RELEASE ORAL ONCE
Status: COMPLETED | OUTPATIENT
Start: 2023-11-22 | End: 2023-11-22

## 2023-11-22 RX ADMIN — POTASSIUM CHLORIDE 40 MEQ: 750 CAPSULE, EXTENDED RELEASE ORAL at 15:15

## 2023-11-22 NOTE — ED PROVIDER NOTES
Time: 2:17 PM EST  Date of encounter:  11/22/2023  Independent Historian/Clinical History and Information was obtained by:   Patient    History is limited by: N/A    Chief Complaint   Patient presents with    Palpitations     Sent by her doctor for low potassium, palpitations, and hypertension that she has had for the last two weeks.         History of Present Illness:  Patient is a 28 y.o. year old female who presents to the emergency department for evaluation of palpitations, hypertension, low potassium levels.  Patient was sent from [facility], states her physician told her she needed to go to the ER for possible infusion, states her potassium level was 2.9.  Patient reports her palpitations feel like her heart is beating strongly, not irregular, reports she also believes this may be related to her anxiety.  (ANDREY Cortés, provider in triage)    Patient is a 28-year-old female who presents with complaints of palpitations, low potassium and high blood pressure.  Was sent over here because they noted that her potassium was 2.9.  She also reports that she has been having palpitations at nighttime.  Reports that she just feels like her heart is racing.  Denies any other complaints this time.    Patient Care Team  Primary Care Provider: Provider, No Known    Past Medical History:     No Known Allergies  Past Medical History:   Diagnosis Date    ADHD (attention deficit hyperactivity disorder) 2008    Anxiety 2013    Drug abuse     Migraines      History reviewed. No pertinent surgical history.  Family History   Problem Relation Age of Onset    Cancer Mother         Breast cancer       Home Medications:  Prior to Admission medications    Medication Sig Start Date End Date Taking? Authorizing Provider   naloxone (NARCAN) 4 MG/0.1ML nasal spray call 911. DO NOT PRIME. SPRAY INTO NOSTRIL at SIGNS OF OPIOID OVERDOSE. REPEAT IN 2-3 MINUTES IN OTHER NOSTRIL IF NOT BREATHING 11/15/23  Yes Provider, MD Babs  "  ondansetron (ZOFRAN) 8 MG tablet Take 1/2 tablet by mouth four times daily (every 6 hours) as needed 11/15/23  Yes Provider, MD Babs   amphetamine-dextroamphetamine (ADDERALL) 10 MG tablet Take 1 tablet by mouth Daily. 11/8/23   Harley Hernandez MD   escitalopram (LEXAPRO) 10 MG tablet TAKE 1/2 TABLET BY MOUTH DAILY FOR 1 WEEK, THEN INCREASE TO 1 TABLET DAILY 10/30/23   Rema Wills APRN   ketorolac (TORADOL) 10 MG tablet Take 1 tablet by mouth Every 6 (Six) Hours As Needed for Moderate Pain. 11/18/23   Arnoldo Flores APRN   Levonorgestrel (Mirena, 52 MG,) 20 MCG/DAY intrauterine device IUD 1 each by Intrauterine route.    Provider, MD Babs   nitrofurantoin, macrocrystal-monohydrate, (MACROBID) 100 MG capsule Take 1 capsule by mouth 2 (Two) Times a Day for 7 days. 11/18/23 11/25/23  Arnoldo Flores APRN   ondansetron ODT (ZOFRAN-ODT) 4 MG disintegrating tablet Place 1 tablet on the tongue Every 8 (Eight) Hours As Needed for Nausea or Vomiting for up to 15 doses. 11/18/23   Arnoldo Flores APRN        Social History:   Social History     Tobacco Use    Smoking status: Never    Smokeless tobacco: Never   Vaping Use    Vaping Use: Every day    Substances: Nicotine    Devices: Refillable tank   Substance Use Topics    Alcohol use: Not Currently     Comment: Couple times during summer    Drug use: Not Currently         Review of Systems:  Review of Systems   Cardiovascular:  Positive for palpitations.        Physical Exam:  /84   Pulse 82   Temp 98.9 °F (37.2 °C) (Oral)   Resp 20   Ht 170.2 cm (67\")   Wt 83.7 kg (184 lb 8 oz)   SpO2 99%   BMI 28.90 kg/m²         Physical Exam  Vitals and nursing note reviewed.   Constitutional:       Appearance: Normal appearance.   HENT:      Head: Normocephalic and atraumatic.   Eyes:      General: No scleral icterus.  Cardiovascular:      Rate and Rhythm: Normal rate and regular rhythm.   Pulmonary:      Effort: Pulmonary effort is normal.      Breath " sounds: Normal breath sounds.   Abdominal:      Palpations: Abdomen is soft.      Tenderness: There is no abdominal tenderness.   Musculoskeletal:         General: Normal range of motion.      Cervical back: Normal range of motion.   Skin:     Findings: No rash.   Neurological:      General: No focal deficit present.      Mental Status: She is alert.                  Procedures:  Procedures      Medical Decision Making:      Comorbidities that affect care:    None    External Notes reviewed:    Reviewed ER note from 11/18/2023      The following orders were placed and all results were independently analyzed by me:  Orders Placed This Encounter   Procedures    XR Chest 1 View    [facility] Draw    Comprehensive Metabolic Panel    Magnesium    Single High Sensitivity Troponin T    TSH    CBC Auto Differential    NPO Diet NPO Type: Strict NPO    Undress & Gown    Continuous Pulse Oximetry    Oxygen Therapy- Nasal Cannula; Titrate 1-6 LPM Per SpO2; 90 - 95%    ECG 12 Lead Other; palpitations    Insert Peripheral IV    CBC & Differential    Green Top (Gel)    Lavender Top    Gold Top - SST    Light Blue Top       Medications Given in the Emergency Department:  Medications   sodium chloride 0.9 % flush 10 mL (has no administration in time range)   potassium chloride (MICRO-K/KLOR-CON) CR capsule (40 mEq Oral Given 11/22/23 1515)        ED Course:    The patient was initially evaluated in the triage area where orders were placed. The patient was later dispositioned by Anthony Adam MD.      The patient was advised to stay for completion of workup which includes but is not limited to communication of labs and radiological results, reassessment and plan. The patient was advised that leaving prior to disposition by a provider could result in critical findings that are not communicated to the patient.     ED Course as of 11/22/23 1527   Wed Nov 22, 2023   1419 EKG interpreted by me  Time: 1225  Heart rate 92  Sinus,  incomplete right bundle, nonspecific ST changes [MA]      ED Course User Index  [MA] Anthony Adam MD       Labs:    Lab Results (last 24 hours)       Procedure Component Value Units Date/Time    CBC & Differential [[id number]]  (Abnormal) Collected: 11/22/23 1241    Specimen: Blood Updated: 11/22/23 1250    Narrative:      The following orders were created for panel order CBC & Differential.  Procedure                               Abnormality         Status                     ---------                               -----------         ------                     CBC Auto Differential[[id number]]        Abnormal            Final result                 Please view results for these tests on the individual orders.    Comprehensive Metabolic Panel [[id number]]  (Abnormal) Collected: 11/22/23 1241    Specimen: Blood Updated: 11/22/23 1312     Glucose 127 mg/dL      BUN 8 mg/dL      Creatinine 0.90 mg/dL      Sodium 141 mmol/L      Potassium 3.1 mmol/L      Chloride 99 mmol/L      CO2 25.5 mmol/L      Calcium 9.5 mg/dL      Total Protein 8.1 g/dL      Albumin 5.0 g/dL      ALT (SGPT) 18 U/L      AST (SGOT) 19 U/L      Alkaline Phosphatase 74 U/L      Total Bilirubin 0.6 mg/dL      Globulin 3.1 gm/dL      A/G Ratio 1.6 g/dL      BUN/Creatinine Ratio 8.9     Anion Gap 16.5 mmol/L      eGFR 90.6 mL/min/1.73     Narrative:      GFR Normal >60  Chronic Kidney Disease <60  Kidney Failure <15      Magnesium [[id number]]  (Normal) Collected: 11/22/23 1241    Specimen: Blood Updated: 11/22/23 1312     Magnesium 2.4 mg/dL     Single High Sensitivity Troponin T [[id number]]  (Normal) Collected: 11/22/23 1241    Specimen: Blood Updated: 11/22/23 1317     HS Troponin T <6 ng/L     Narrative:      High Sensitive Troponin T Reference Range:  <14.0 ng/L- Negative Female for AMI  <22.0 ng/L- Negative Male for AMI  >=14 - Abnormal Female indicating possible myocardial injury.  >=22 - Abnormal Male indicating possible myocardial injury.    Clinicians would have to utilize clinical acumen, EKG, Troponin, and serial changes to determine if it is an Acute Myocardial Infarction or myocardial injury due to an underlying chronic condition.         TSH [[id number]]  (Normal) Collected: 11/22/23 1241    Specimen: Blood Updated: 11/22/23 1317     TSH 1.180 uIU/mL     CBC Auto Differential [[id number]]  (Abnormal) Collected: 11/22/23 1241    Specimen: Blood Updated: 11/22/23 1250     WBC 7.32 10*3/mm3      RBC 4.10 10*6/mm3      Hemoglobin 11.9 g/dL      Hematocrit 36.0 %      MCV 87.8 fL      MCH 29.0 pg      MCHC 33.1 g/dL      RDW 13.3 %      RDW-SD 43.1 fl      MPV 10.3 fL      Platelets 322 10*3/mm3      Neutrophil % 62.7 %      Lymphocyte % 29.5 %      Monocyte % 6.8 %      Eosinophil % 0.5 %      Basophil % 0.4 %      Immature Grans % 0.1 %      Neutrophils, Absolute 4.58 10*3/mm3      Lymphocytes, Absolute 2.16 10*3/mm3      Monocytes, Absolute 0.50 10*3/mm3      Eosinophils, Absolute 0.04 10*3/mm3      Basophils, Absolute 0.03 10*3/mm3      Immature Grans, Absolute 0.01 10*3/mm3      nRBC 0.0 /100 WBC              Imaging:    XR Chest 1 View    Result Date: 11/22/2023  PROCEDURE: XR CHEST 1 VW  COMPARISON: [facility], , XR CHEST 1 VW, 7/26/2022, 20:46.  INDICATIONS: Palpitations  x5 days  FINDINGS:  LUNGS: Normal.  No significant pulmonary parenchymal abnormalities.  VASCULATURE: Normal.  Unremarkable pulmonary vasculature.  CARDIAC: Normal.  No cardiac silhouette abnormality or cardiomegaly.  MEDIASTINUM: Normal.  No visible mass or adenopathy.  PLEURA: Normal.  No effusion or pleural thickening.  BONES: Normal.  No fracture or visible bony lesion.  OTHER: Negative.        No acute cardiopulmonary abnormality.       OLGA BERG MD       Electronically Signed and Approved By: OLGA BERG MD on 11/22/2023 at 13:22                Differential Diagnosis and Discussion:      Palpitations: Differential diagnosis includes but is not limited  to anxiety, atrioventricular blocks, mitral valve disease, hypoxia, coronary artery disease, hypokalemia, anemia, fever, COPD, congestive heart failure, pericarditis, Cathy-Parkinson-White syndrome, pulmonary embolism, SVT, atrial fibrillation, atrial flutter, sinus tachycardia, thyrotoxicosis, and pheochromocytoma.    All labs were reviewed and interpreted by me.  All X-rays impressions were independently interpreted by me.  EKG was interpreted by me.    MDM     Patient is a 28-year-old female who presents with complaints of palpitations as well as low potassium.  Found to have a potassium of 3.1 as well as reported palpitations.  Imaging and lab work otherwise unremarkable.  Will give p.o. potassium here and have the patient follow-up as an outpatient with cardiology and primary care doctor.  Also with slightly elevated blood pressure which I recommend that she follow-up with her primary care doctor for.          Patient Care Considerations:          Consultants/Shared Management Plan:    None    Social Determinants of Health:    Patient is independent, reliable, and has access to care.       Disposition and Care Coordination:    Discharged: The patient is suitable and stable for discharge with no need for consideration of observation or admission.    I have explained the patient´s condition, diagnoses and treatment plan based on the information available to me at this time. I have answered questions and addressed any concerns. The patient has a good  understanding of the patient´s diagnosis, condition, and treatment plan as can be expected at this point. The vital signs have been stable. The patient´s condition is stable and appropriate for discharge from the emergency department.      The patient will pursue further outpatient evaluation with the primary care physician or other designated or consulting physician as outlined in the discharge instructions. They are agreeable to this plan of care and follow-up  instructions have been explained in detail. The patient has received these instructions in written format and have expressed an understanding of the discharge instructions. The patient is aware that any significant change in condition or worsening of symptoms should prompt an immediate return to this or the closest emergency department or call to 911.      Final diagnoses:   Palpitations   Hypokalemia        ED Disposition       ED Disposition   Discharge    Condition   Stable    Comment   --               This medical record created using voice recognition software.             Anthony Adam MD  11/22/23 8780

## 2023-11-29 LAB
QT INTERVAL: 384 MS
QTC INTERVAL: 476 MS

## 2024-02-06 ENCOUNTER — TELEPHONE (OUTPATIENT)
Dept: INTERNAL MEDICINE | Facility: CLINIC | Age: 29
End: 2024-02-06
Payer: COMMERCIAL

## 2024-02-06 ENCOUNTER — OFFICE VISIT (OUTPATIENT)
Dept: INTERNAL MEDICINE | Facility: CLINIC | Age: 29
End: 2024-02-06
Payer: COMMERCIAL

## 2024-02-06 ENCOUNTER — TELEPHONE (OUTPATIENT)
Dept: INTERNAL MEDICINE | Facility: CLINIC | Age: 29
End: 2024-02-06

## 2024-02-06 VITALS
BODY MASS INDEX: 28.35 KG/M2 | HEIGHT: 67 IN | WEIGHT: 180.6 LBS | SYSTOLIC BLOOD PRESSURE: 124 MMHG | RESPIRATION RATE: 18 BRPM | DIASTOLIC BLOOD PRESSURE: 76 MMHG | TEMPERATURE: 97.6 F | OXYGEN SATURATION: 98 % | HEART RATE: 78 BPM

## 2024-02-06 DIAGNOSIS — F41.1 GENERALIZED ANXIETY DISORDER: ICD-10-CM

## 2024-02-06 DIAGNOSIS — K62.5 BRBPR (BRIGHT RED BLOOD PER RECTUM): ICD-10-CM

## 2024-02-06 DIAGNOSIS — K59.09 CHRONIC CONSTIPATION: Primary | ICD-10-CM

## 2024-02-06 PROCEDURE — 99214 OFFICE O/P EST MOD 30 MIN: CPT

## 2024-02-06 PROCEDURE — 1159F MED LIST DOCD IN RCRD: CPT

## 2024-02-06 PROCEDURE — 1160F RVW MEDS BY RX/DR IN RCRD: CPT

## 2024-02-06 RX ORDER — RIMEGEPANT SULFATE 75 MG/75MG
75 TABLET, ORALLY DISINTEGRATING ORAL EVERY OTHER DAY
COMMUNITY

## 2024-02-06 RX ORDER — BUSPIRONE HYDROCHLORIDE 10 MG/1
10 TABLET ORAL 3 TIMES DAILY PRN
Qty: 90 TABLET | Refills: 1 | Status: SHIPPED | OUTPATIENT
Start: 2024-02-06

## 2024-02-06 RX ORDER — ESCITALOPRAM OXALATE 10 MG/1
10 TABLET ORAL DAILY
Qty: 30 TABLET | Refills: 5 | Status: SHIPPED | OUTPATIENT
Start: 2024-02-06

## 2024-02-06 NOTE — ASSESSMENT & PLAN NOTE
She states that she has chronic constipation. She admits to an episode today where she was trying to have a bowel movement today and was in severe pain. She ended up having to leave work, she had to take a hot bath and an enema and she was able to have a bowel movement.   She states she has been taking magnesium citrate to help with bowel movements.   Patient states she is constipated all the time. She takes magnesium citrate and Miralax PRN.    She states she has also had blood in her stool and that concerns her. She states it is bright red.  He is requesting a referral to gastro.  Pt has routine lab orders in, plus Mery added in iron profile and stool for blood.  Will also go ahead and get her started on Linzess for constipation.  Also encouraged dietary modifications, plenty of fruits and vegetables and increasing her fluid intake.

## 2024-02-06 NOTE — ASSESSMENT & PLAN NOTE
Was recently started on Lexapro and does feel like it is helpful for her anxiety. she is requesting refill today as she has run out.  We will refill it today.  She also states that she would like a refill on the BuSpar as well.  Will get that sent in for her to take as needed.  Will follow-up in about a month or so.

## 2024-02-06 NOTE — PROGRESS NOTES
Chief Complaint  Rectal Bleeding (28 year old female here today complaining of abdominal pain and rectal bleeding. States she has an ongoing issue with constipation. States today she was trying to have a bowel movement and she started bleeding. States she tried a fleet enema and took a hot bath, states she was able to have a bowel movement. States she bled a lot afterwards. She would like a referral to Gastro for an EGD. ) and Anxiety (She would like to discuss starting on Buspar again for her anxiety )    History of Present Illness  SUBJECTIVE  Areli Hernandez presents to [facility] with complaints of changes in bowel habits. She states that she has constipation. She states that she was trying to have a bowel movement today and was in severe pain. She ended up taking a hot bath and an enema and she was able to have a bowel movement.   She states she has been taking magnesium citrate to help with bowel movements.   Patient states she is constipated all the time. She takes magnesium citrate and Miralax PRN.   She denies any abdominal pain, nausea, vomiting or diarrhea.      Past Medical History:   Diagnosis Date    ADHD (attention deficit hyperactivity disorder) 2008    Anxiety 2013    Drug abuse     Migraines       Family History   Problem Relation Age of Onset    Cancer Mother         Breast cancer      History reviewed. No pertinent surgical history.     Current Outpatient Medications:     Levonorgestrel (Mirena, 52 MG,) 20 MCG/DAY intrauterine device IUD, 1 each by Intrauterine route., Disp: , Rfl:     ondansetron ODT (ZOFRAN-ODT) 4 MG disintegrating tablet, Place 1 tablet on the tongue Every 8 (Eight) Hours As Needed for Nausea or Vomiting for up to 15 doses., Disp: 15 tablet, Rfl: 0    Rimegepant Sulfate (Nurtec) 75 MG tablet dispersible tablet, Take 1 tablet by mouth Every Other Day., Disp: , Rfl:     busPIRone (BUSPAR) 10 MG tablet, Take 1 tablet by mouth 3 (Three) Times  "a Day As Needed (anxiety)., Disp: 90 tablet, Rfl: 1    escitalopram (Lexapro) 10 MG tablet, Take 1 tablet by mouth Daily., Disp: 30 tablet, Rfl: 5    linaclotide (Linzess) 72 MCG capsule capsule, Take 1 capsule by mouth Every Morning Before Breakfast., Disp: 30 capsule, Rfl: 5    OBJECTIVE  Vital Signs:   /76 (BP Location: Left arm, Patient Position: Sitting)   Pulse 78   Temp 97.6 °F (36.4 °C) (Temporal)   Resp 18   Ht 170.2 cm (67\")   Wt 81.9 kg (180 lb 9.6 oz)   SpO2 98%   BMI 28.29 kg/m²    Estimated body mass index is 28.29 kg/m² as calculated from the following:    Height as of this encounter: 170.2 cm (67\").    Weight as of this encounter: 81.9 kg (180 lb 9.6 oz).     Wt Readings from Last 3 Encounters:   02/06/24 81.9 kg (180 lb 9.6 oz)   11/22/23 83.7 kg (184 lb 8 oz)   11/18/23 82.7 kg (182 lb 5.1 oz)     BP Readings from Last 3 Encounters:   02/06/24 124/76   11/22/23 131/84   11/18/23 136/84       Physical Exam  Vitals and nursing note reviewed.   Constitutional:       Appearance: Normal appearance.   HENT:      Head: Normocephalic.   Eyes:      Extraocular Movements: Extraocular movements intact.      Conjunctiva/sclera: Conjunctivae normal.   Cardiovascular:      Rate and Rhythm: Normal rate and regular rhythm.      Heart sounds: Normal heart sounds. No murmur heard.  Pulmonary:      Effort: Pulmonary effort is normal.      Breath sounds: Normal breath sounds. No wheezing or rales.   Abdominal:      General: Bowel sounds are normal.      Palpations: Abdomen is soft.      Tenderness: There is no abdominal tenderness. There is no guarding.   Musculoskeletal:         General: No swelling. Normal range of motion.   Skin:     General: Skin is warm and dry.   Neurological:      General: No focal deficit present.      Mental Status: She is alert. Mental status is at baseline.   Psychiatric:         Mood and Affect: Mood normal.         Thought Content: Thought content normal.          Result " Review        XR Chest 1 View    Result Date: 11/22/2023   No acute cardiopulmonary abnormality.       OLGA BERG MD       Electronically Signed and Approved By: OLGA BERG MD on 11/22/2023 at 13:22                The above data has been reviewed by ANDREY Acosta 02/06/2024 16:09 EST.          Patient Care Team:  Rema Wills APRN as PCP - General (Internal Medicine)        ASSESSMENT & PLAN    Diagnoses and all orders for this visit:    1. Chronic constipation (Primary)  Assessment & Plan:  She states that she has chronic constipation. She admits to an episode today where she was trying to have a bowel movement today and was in severe pain. She ended up having to leave work, she had to take a hot bath and an enema and she was able to have a bowel movement.   She states she has been taking magnesium citrate to help with bowel movements.   Patient states she is constipated all the time. She takes magnesium citrate and Miralax PRN.    She states she has also had blood in her stool and that concerns her. She states it is bright red.  He is requesting a referral to gastro.  Pt has routine lab orders in, plus Mery added in iron profile and stool for blood.  Will also go ahead and get her started on Linzess for constipation.  Also encouraged dietary modifications, plenty of fruits and vegetables and increasing her fluid intake.      Orders:  -     Cancel: Ambulatory Referral to Gastroenterology  -     linaclotide (Linzess) 72 MCG capsule capsule; Take 1 capsule by mouth Every Morning Before Breakfast.  Dispense: 30 capsule; Refill: 5  -     Ambulatory Referral to Gastroenterology    2. BRBPR (bright red blood per rectum)  -     Cancel: Ambulatory Referral to Gastroenterology  -     Ambulatory Referral to Gastroenterology  -     Iron Profile; Future  -     Occult Blood X 3, Stool - Stool, Per Rectum; Future    3. Generalized anxiety disorder  Assessment & Plan:  Was recently started on Lexapro and does feel like it  is helpful for her anxiety. she is requesting refill today as she has run out.  We will refill it today.  She also states that she would like a refill on the BuSpar as well.  Will get that sent in for her to take as needed.  Will follow-up in about a month or so.      Orders:  -     busPIRone (BUSPAR) 10 MG tablet; Take 1 tablet by mouth 3 (Three) Times a Day As Needed (anxiety).  Dispense: 90 tablet; Refill: 1  -     escitalopram (Lexapro) 10 MG tablet; Take 1 tablet by mouth Daily.  Dispense: 30 tablet; Refill: 5         Tobacco Use: Low Risk  (2/6/2024)    Patient History     Smoking Tobacco Use: Never     Smokeless Tobacco Use: Never     Passive Exposure: Not on file       Follow Up     Return in about 5 weeks (around 3/12/2024) for Annual physical.    Please note that portions of this note were completed with a voice recognition program.    Patient was given instructions and counseling regarding her condition or for health maintenance advice. Please see specific information pulled into the AVS if appropriate.   I have reviewed information obtained and documented by others and I have confirmed the accuracy of this documented note.    ANDREY Acosta

## 2024-04-25 RX ORDER — RIMEGEPANT SULFATE 75 MG/75MG
TABLET, ORALLY DISINTEGRATING ORAL
Qty: 16 TABLET | Refills: 1 | Status: SHIPPED | OUTPATIENT
Start: 2024-04-25

## 2024-07-18 ENCOUNTER — TELEPHONE (OUTPATIENT)
Dept: INTERNAL MEDICINE | Age: 29
End: 2024-07-18
Payer: COMMERCIAL

## 2024-07-18 RX ORDER — RIMEGEPANT SULFATE 75 MG/75MG
75 TABLET, ORALLY DISINTEGRATING ORAL EVERY OTHER DAY
Qty: 16 TABLET | Refills: 1 | Status: SHIPPED | OUTPATIENT
Start: 2024-07-18

## 2024-07-18 NOTE — TELEPHONE ENCOUNTER
[facility] Stated alfonzo has faxed request and we have not responded. She is going on vacation leaving Sunday and wishes to have this refilled before then.      Pt # [phone]    Uses [facility]

## 2024-10-19 PROCEDURE — 87086 URINE CULTURE/COLONY COUNT: CPT | Performed by: FAMILY MEDICINE

## 2024-10-30 ENCOUNTER — APPOINTMENT (OUTPATIENT)
Dept: CT IMAGING | Facility: HOSPITAL | Age: 29
End: 2024-10-30
Payer: COMMERCIAL

## 2024-10-30 ENCOUNTER — HOSPITAL ENCOUNTER (EMERGENCY)
Facility: HOSPITAL | Age: 29
Discharge: HOME | End: 2024-10-30
Attending: EMERGENCY MEDICINE | Admitting: EMERGENCY MEDICINE
Payer: COMMERCIAL

## 2024-10-30 VITALS
RESPIRATION RATE: 18 BRPM | DIASTOLIC BLOOD PRESSURE: 55 MMHG | OXYGEN SATURATION: 99 % | HEIGHT: 67 IN | TEMPERATURE: 98.2 F | SYSTOLIC BLOOD PRESSURE: 97 MMHG | WEIGHT: 155.2 LBS | HEART RATE: 62 BPM | BODY MASS INDEX: 24.36 KG/M2

## 2024-10-30 DIAGNOSIS — R10.84 GENERALIZED ABDOMINAL PAIN: ICD-10-CM

## 2024-10-30 DIAGNOSIS — K59.00 CONSTIPATION, UNSPECIFIED CONSTIPATION TYPE: Primary | ICD-10-CM

## 2024-10-30 DIAGNOSIS — R11.2 NAUSEA AND VOMITING, UNSPECIFIED VOMITING TYPE: ICD-10-CM

## 2024-10-30 LAB
ALBUMIN SERPL-MCNC: 4.7 G/DL (ref 3.5–5.2)
ALBUMIN/GLOB SERPL: 1.6 G/DL
ALP SERPL-CCNC: 54 U/L (ref 39–117)
ALT SERPL W P-5'-P-CCNC: 14 U/L (ref 1–33)
ANION GAP SERPL CALCULATED.3IONS-SCNC: 7.5 MMOL/L (ref 5–15)
AST SERPL-CCNC: 16 U/L (ref 1–32)
BACTERIA UR QL AUTO: ABNORMAL /HPF
BASOPHILS # BLD AUTO: 0.02 10*3/MM3 (ref 0–0.2)
BASOPHILS NFR BLD AUTO: 0.4 % (ref 0–1.5)
BILIRUB SERPL-MCNC: 0.3 MG/DL (ref 0–1.2)
BILIRUB UR QL STRIP: NEGATIVE
BUN SERPL-MCNC: 11 MG/DL (ref 6–20)
BUN/CREAT SERPL: 14.3 (ref 7–25)
CALCIUM SPEC-SCNC: 9.3 MG/DL (ref 8.6–10.5)
CHLORIDE SERPL-SCNC: 103 MMOL/L (ref 98–107)
CLARITY UR: ABNORMAL
CO2 SERPL-SCNC: 27.5 MMOL/L (ref 22–29)
COLOR UR: ABNORMAL
CREAT SERPL-MCNC: 0.77 MG/DL (ref 0.57–1)
DEPRECATED RDW RBC AUTO: 41.1 FL (ref 37–54)
EGFRCR SERPLBLD CKD-EPI 2021: 108.6 ML/MIN/1.73
EOSINOPHIL # BLD AUTO: 0.04 10*3/MM3 (ref 0–0.4)
EOSINOPHIL NFR BLD AUTO: 0.8 % (ref 0.3–6.2)
ERYTHROCYTE [DISTWIDTH] IN BLOOD BY AUTOMATED COUNT: 12.5 % (ref 12.3–15.4)
GLOBULIN UR ELPH-MCNC: 2.9 GM/DL
GLUCOSE SERPL-MCNC: 97 MG/DL (ref 65–99)
GLUCOSE UR STRIP-MCNC: NEGATIVE MG/DL
HCG INTACT+B SERPL-ACNC: <0.5 MIU/ML
HCT VFR BLD AUTO: 36.7 % (ref 34–46.6)
HGB BLD-MCNC: 11.9 G/DL (ref 12–15.9)
HGB UR QL STRIP.AUTO: NEGATIVE
HOLD SPECIMEN: NORMAL
HOLD SPECIMEN: NORMAL
HYALINE CASTS UR QL AUTO: ABNORMAL /LPF
IMM GRANULOCYTES # BLD AUTO: 0.01 10*3/MM3 (ref 0–0.05)
IMM GRANULOCYTES NFR BLD AUTO: 0.2 % (ref 0–0.5)
KETONES UR QL STRIP: NEGATIVE
LEUKOCYTE ESTERASE UR QL STRIP.AUTO: ABNORMAL
LIPASE SERPL-CCNC: 30 U/L (ref 13–60)
LYMPHOCYTES # BLD AUTO: 0.96 10*3/MM3 (ref 0.7–3.1)
LYMPHOCYTES NFR BLD AUTO: 18.3 % (ref 19.6–45.3)
MCH RBC QN AUTO: 29 PG (ref 26.6–33)
MCHC RBC AUTO-ENTMCNC: 32.4 G/DL (ref 31.5–35.7)
MCV RBC AUTO: 89.3 FL (ref 79–97)
MONOCYTES # BLD AUTO: 0.42 10*3/MM3 (ref 0.1–0.9)
MONOCYTES NFR BLD AUTO: 8 % (ref 5–12)
NEUTROPHILS NFR BLD AUTO: 3.81 10*3/MM3 (ref 1.7–7)
NEUTROPHILS NFR BLD AUTO: 72.3 % (ref 42.7–76)
NITRITE UR QL STRIP: NEGATIVE
NRBC BLD AUTO-RTO: 0 /100 WBC (ref 0–0.2)
PH UR STRIP.AUTO: <=5 [PH] (ref 5–8)
PLATELET # BLD AUTO: 204 10*3/MM3 (ref 140–450)
PMV BLD AUTO: 11.2 FL (ref 6–12)
POTASSIUM SERPL-SCNC: 4.4 MMOL/L (ref 3.5–5.2)
PROT SERPL-MCNC: 7.6 G/DL (ref 6–8.5)
PROT UR QL STRIP: ABNORMAL
RBC # BLD AUTO: 4.11 10*6/MM3 (ref 3.77–5.28)
RBC # UR STRIP: ABNORMAL /HPF
REF LAB TEST METHOD: ABNORMAL
SODIUM SERPL-SCNC: 138 MMOL/L (ref 136–145)
SP GR UR STRIP: 1.02 (ref 1–1.03)
SQUAMOUS #/AREA URNS HPF: ABNORMAL /HPF
UROBILINOGEN UR QL STRIP: ABNORMAL
WBC # UR STRIP: ABNORMAL /HPF
WBC NRBC COR # BLD AUTO: 5.26 10*3/MM3 (ref 3.4–10.8)
WHOLE BLOOD HOLD COAG: NORMAL
WHOLE BLOOD HOLD SPECIMEN: NORMAL

## 2024-10-30 PROCEDURE — 96374 THER/PROPH/DIAG INJ IV PUSH: CPT

## 2024-10-30 PROCEDURE — 83690 ASSAY OF LIPASE: CPT | Performed by: EMERGENCY MEDICINE

## 2024-10-30 PROCEDURE — 81001 URINALYSIS AUTO W/SCOPE: CPT | Performed by: EMERGENCY MEDICINE

## 2024-10-30 PROCEDURE — 25010000002 ONDANSETRON PER 1 MG: Performed by: EMERGENCY MEDICINE

## 2024-10-30 PROCEDURE — 25810000003 SODIUM CHLORIDE 0.9 % SOLUTION: Performed by: EMERGENCY MEDICINE

## 2024-10-30 PROCEDURE — 80053 COMPREHEN METABOLIC PANEL: CPT | Performed by: EMERGENCY MEDICINE

## 2024-10-30 PROCEDURE — 99285 EMERGENCY DEPT VISIT HI MDM: CPT

## 2024-10-30 PROCEDURE — 84702 CHORIONIC GONADOTROPIN TEST: CPT | Performed by: EMERGENCY MEDICINE

## 2024-10-30 PROCEDURE — 74177 CT ABD & PELVIS W/CONTRAST: CPT

## 2024-10-30 PROCEDURE — 25510000001 IOPAMIDOL PER 1 ML: Performed by: EMERGENCY MEDICINE

## 2024-10-30 PROCEDURE — 85025 COMPLETE CBC W/AUTO DIFF WBC: CPT | Performed by: EMERGENCY MEDICINE

## 2024-10-30 RX ORDER — ONDANSETRON 2 MG/ML
4 INJECTION INTRAMUSCULAR; INTRAVENOUS ONCE
Status: COMPLETED | OUTPATIENT
Start: 2024-10-30 | End: 2024-10-30

## 2024-10-30 RX ORDER — IOPAMIDOL 755 MG/ML
100 INJECTION, SOLUTION INTRAVASCULAR
Status: COMPLETED | OUTPATIENT
Start: 2024-10-30 | End: 2024-10-30

## 2024-10-30 RX ORDER — SODIUM CHLORIDE 0.9 % (FLUSH) 0.9 %
10 SYRINGE (ML) INJECTION AS NEEDED
Status: DISCONTINUED | OUTPATIENT
Start: 2024-10-30 | End: 2024-10-30 | Stop reason: HOSPADM

## 2024-10-30 RX ADMIN — ONDANSETRON 4 MG: 2 INJECTION INTRAMUSCULAR; INTRAVENOUS at 11:25

## 2024-10-30 RX ADMIN — SODIUM CHLORIDE 1000 ML: 0.9 INJECTION, SOLUTION INTRAVENOUS at 11:25

## 2024-10-30 RX ADMIN — IOPAMIDOL 100 ML: 755 INJECTION, SOLUTION INTRAVENOUS at 13:17

## 2024-10-30 NOTE — Clinical Note
[facility] EMERGENCY ROOM  [address] [place] [address]  Phone: [phone]  Fax: [phone]    Areli Hernandez was seen and treated in our emergency department on 10/30/2024.  She may return to work on 10/31/2024.         Thank you for choosing [facility].    Zaid Jaramillo, DO

## 2024-10-30 NOTE — Clinical Note
[facility] EMERGENCY ROOM  [address] [place] [address]  Phone: [phone]  Fax: [phone]    Areli Hernandez was seen and treated in our emergency department on 10/30/2024.  She may return to work on 10/31/2024.         Thank you for choosing [facility].    Zaid Jaramillo, DO

## 2024-10-30 NOTE — ED PROVIDER NOTES
Time: 9:58 AM EDT  Date of encounter:  10/30/2024  Independent Historian/Clinical History and Information was obtained by:   Patient    History is limited by: N/A    Chief Complaint: Abdominal pain      History of Present Illness:  Patient is a 29 y.o. year old female who presents to the emergency department for evaluation of abdominal pain.  Patient has had longstanding issues with intermittent episodes of abdominal pain.  States she has constipation and at times will take Linzess.  He is not currently on any narcotic pain medicines.  She states her last bowel movement was approximately 4 to 5 days ago.  She states today she had very nauseated and vomited several times.  She has not been seen previously by gastroenterology.      Patient Care Team  Primary Care Provider: Rema Wills APRN    Past Medical History:     No Known Allergies  Past Medical History:   Diagnosis Date    ADHD (attention deficit hyperactivity disorder) 2008    Anxiety 2013    Drug abuse     Migraines      History reviewed. No pertinent surgical history.  Family History   Problem Relation Age of Onset    Cancer Mother         Breast cancer       Home Medications:  Prior to Admission medications    Medication Sig Start Date End Date Taking? Authorizing Provider   atomoxetine (STRATTERA) 40 MG capsule  10/10/24   Babs Blanco MD   Levonorgestrel (Mirena, 52 MG,) 20 MCG/DAY intrauterine device IUD 1 each by Intrauterine route.    ProviderBabs MD   nitrofurantoin, macrocrystal-monohydrate, (MACROBID) 100 MG capsule Take 1 capsule by mouth 2 (Two) Times a Day. 10/19/24   Aj Motta MD   Rimegepant Sulfate (Nurtec) 75 MG tablet dispersible tablet Take 1 tablet by mouth Every Other Day. 7/18/24   Verito Meng APRN   Sublocade 100 MG/0.5ML injection INJECT 100MG SUBCUTANEOUSLY ONCE MONTHLY 8/28/24   Babs Blanco MD   Sublocade 300 MG/1.5ML solution prefilled syringe INJECT 300MG SUBCUTANEOUSLY ONCE MONTHLY  "9/27/24   Provider, Babs, MD        Social History:   Social History     Tobacco Use    Smoking status: Never    Smokeless tobacco: Never   Vaping Use    Vaping status: Every Day    Substances: Nicotine    Devices: Refillable tank   Substance Use Topics    Alcohol use: Not Currently     Comment: Couple times during summer    Drug use: Not Currently         Review of Systems:  Review of Systems   Gastrointestinal:  Positive for abdominal pain, nausea and vomiting.        Physical Exam:  BP 97/55 (BP Location: Right arm, Patient Position: Sitting)   Pulse 62   Temp 98.2 °F (36.8 °C) (Oral)   Resp 18   Ht 170.2 cm (67\")   Wt 70.4 kg (155 lb 3.3 oz)   LMP  (LMP Unknown)   SpO2 99%   BMI 24.31 kg/m²     Physical Exam  Vitals and nursing note reviewed.   Constitutional:       General: She is not in acute distress.  Cardiovascular:      Rate and Rhythm: Normal rate and regular rhythm.      Heart sounds: Normal heart sounds.   Pulmonary:      Effort: Pulmonary effort is normal. No respiratory distress.      Breath sounds: Normal breath sounds.   Abdominal:      General: Abdomen is flat.      Palpations: Abdomen is soft.      Tenderness: There is abdominal tenderness in the suprapubic area and left lower quadrant.   Musculoskeletal:         General: Normal range of motion.      Cervical back: Normal range of motion and neck supple.   Skin:     General: Skin is warm and dry.   Neurological:      Mental Status: She is alert and oriented to person, place, and time. Mental status is at baseline.                  Procedures:  Procedures      Medical Decision Making:      Comorbidities that affect care:    Constipation    External Notes reviewed:    Previous Clinic Note: Patient seen in urgent care on 10/19/2024 diagnosed with urinary tract infection      The following orders were placed and all results were independently analyzed by me:  Orders Placed This Encounter   Procedures    CT Abdomen Pelvis With Contrast    " [facility] Draw    Comprehensive Metabolic Panel    Lipase    Urinalysis With Microscopic If Indicated (No Culture) - Urine, Clean Catch    hCG, Quantitative, Pregnancy    CBC Auto Differential    Urinalysis, Microscopic Only - Urine, Clean Catch    Ambulatory Referral to Gastroenterology    NPO Diet NPO Type: Strict NPO    Undress & Gown    Insert Peripheral IV    CBC & Differential    Green Top (Gel)    Lavender Top    Gold Top - SST    Light Blue Top       Medications Given in the Emergency Department:  Medications   sodium chloride 0.9 % flush 10 mL (has no administration in time range)   ondansetron (ZOFRAN) injection 4 mg (4 mg Intravenous Given 10/30/24 1125)   sodium chloride 0.9 % bolus 1,000 mL (0 mL Intravenous Stopped 10/30/24 1451)   iopamidol (ISOVUE-370) 76 % injection 100 mL (100 mL Intravenous Given 10/30/24 1317)        ED Course:         Labs:    Lab Results (last 24 hours)       Procedure Component Value Units Date/Time    Urinalysis With Microscopic If Indicated (No Culture) - Urine, Clean Catch [[id number]]  (Abnormal) Collected: 10/30/24 1048    Specimen: Urine, Clean Catch Updated: 10/30/24 1121     Color, UA Dark Yellow     Appearance, UA Turbid     pH, UA <=5.0     Specific Gravity, UA 1.024     Glucose, UA Negative     Ketones, UA Negative     Bilirubin, UA Negative     Blood, UA Negative     Protein, UA Trace     Leuk Esterase, UA Small (1+)     Nitrite, UA Negative     Urobilinogen, UA 1.0 E.U./dL    Urinalysis, Microscopic Only - Urine, Clean Catch [[id number]]  (Abnormal) Collected: 10/30/24 1048    Specimen: Urine, Clean Catch Updated: 10/30/24 1134     RBC, UA None Seen /HPF      WBC, UA 6-10 /HPF      Bacteria, UA 4+ /HPF      Squamous Epithelial Cells, UA 31-50 /HPF      Hyaline Casts, UA None Seen /LPF      Methodology Manual Light Microscopy    CBC & Differential [[id number]]  (Abnormal) Collected: 10/30/24 1118    Specimen: Blood Updated: 10/30/24 1128    Narrative:      The following  orders were created for panel order CBC & Differential.  Procedure                               Abnormality         Status                     ---------                               -----------         ------                     CBC Auto Differential[[id number]]        Abnormal            Final result                 Please view results for these tests on the individual orders.    Comprehensive Metabolic Panel [[id number]] Collected: 10/30/24 1118    Specimen: Blood Updated: 10/30/24 1146     Glucose 97 mg/dL      BUN 11 mg/dL      Creatinine 0.77 mg/dL      Sodium 138 mmol/L      Potassium 4.4 mmol/L      Chloride 103 mmol/L      CO2 27.5 mmol/L      Calcium 9.3 mg/dL      Total Protein 7.6 g/dL      Albumin 4.7 g/dL      ALT (SGPT) 14 U/L      AST (SGOT) 16 U/L      Alkaline Phosphatase 54 U/L      Total Bilirubin 0.3 mg/dL      Globulin 2.9 gm/dL      A/G Ratio 1.6 g/dL      BUN/Creatinine Ratio 14.3     Anion Gap 7.5 mmol/L      eGFR 108.6 mL/min/1.73     Narrative:      GFR Normal >60  Chronic Kidney Disease <60  Kidney Failure <15      Lipase [[id number]]  (Normal) Collected: 10/30/24 1118    Specimen: Blood Updated: 10/30/24 1146     Lipase 30 U/L     hCG, Quantitative, Pregnancy [[id number]] Collected: 10/30/24 1118    Specimen: Blood Updated: 10/30/24 1146     HCG Quantitative <0.50 mIU/mL     Narrative:      HCG Ranges by Gestational Age    Females - non-pregnant premenopausal   </= 1mIU/mL HCG  Females - postmenopausal               </= 7mIU/mL HCG    3 Weeks       5.4   -      72 mIU/mL  4 Weeks      10.2   -     708 mIU/mL  5 Weeks       217   -   8,245 mIU/mL  6 Weeks       152   -  32,177 mIU/mL  7 Weeks     4,059   - 153,767 mIU/mL  8 Weeks    31,366   - 149,094 mIU/mL  9 Weeks    59,109   - 135,901 mIU/mL  10 Weeks   44,186   - 170,409 mIU/mL  12 Weeks   27,107   - 201,615 mIU/mL  14 Weeks   24,302   -  93,646 mIU/mL  15 Weeks   12,540   -  69,747 mIU/mL  16 Weeks    8,904   -  55,332 mIU/mL  17 Weeks     8,240   -  51,793 mIU/mL  18 Weeks    9,649   -  55,271 mIU/mL      CBC Auto Differential [[id number]]  (Abnormal) Collected: 10/30/24 1118    Specimen: Blood Updated: 10/30/24 1128     WBC 5.26 10*3/mm3      RBC 4.11 10*6/mm3      Hemoglobin 11.9 g/dL      Hematocrit 36.7 %      MCV 89.3 fL      MCH 29.0 pg      MCHC 32.4 g/dL      RDW 12.5 %      RDW-SD 41.1 fl      MPV 11.2 fL      Platelets 204 10*3/mm3      Neutrophil % 72.3 %      Lymphocyte % 18.3 %      Monocyte % 8.0 %      Eosinophil % 0.8 %      Basophil % 0.4 %      Immature Grans % 0.2 %      Neutrophils, Absolute 3.81 10*3/mm3      Lymphocytes, Absolute 0.96 10*3/mm3      Monocytes, Absolute 0.42 10*3/mm3      Eosinophils, Absolute 0.04 10*3/mm3      Basophils, Absolute 0.02 10*3/mm3      Immature Grans, Absolute 0.01 10*3/mm3      nRBC 0.0 /100 WBC              Imaging:    CT Abdomen Pelvis With Contrast    Result Date: 10/30/2024  CT ABDOMEN PELVIS W CONTRAST Date of Exam: 10/30/2024 1:10 PM EDT Indication: Lower abdominal pain abdominal pain. Comparison: CT abdomen pelvis 6/21/2022 Technique: Axial CT images were obtained of the abdomen and pelvis after the uneventful intravenous administration of iodinated contrast. Reconstructed coronal and sagittal images were also obtained. Automated exposure control and iterative construction methods were used. Findings: Heart size normal. No evidence of active infection in the lower lungs. Normal size and contour of liver and spleen. Benign focal fat along falciform ligament. Gallbladder and biliary tree unremarkable. No evidence of active pancreatitis or drainable collection. No suspicious adrenal nodule. Symmetric renal size, contour and  enhancement. No solid mass or hydronephrosis. Urinary bladder unremarkable. Anteverted uterus with IUD in appropriate position. Physiologic appearance of the adnexa. Minimal physiologic pelvic fluid without free air or drainable collection. Expected configuration stomach  and duodenum. Appendix visualized in the right lower quadrant/upper pelvis and appears normal. Moderate colonic stool. Normal caliber abdominal aorta. No suspicious adenopathy. Nonspecific densities in the anterior abdominal wall soft tissues commonly seen with medication related injection change. Contusion from trauma possible in the right clinical setting. Slight thoracic dextrocurvature which may be positional. Negative for displaced fracture or aggressive bone lesion.     Impression: No acute CT findings. Normal appendix. No hydronephrosis. Physiologic appearance of the ovaries. Electronically Signed: Marcos Mahoney MD  10/30/2024 1:42 PM EDT  Workstation ID: [id number]       Differential Diagnosis and Discussion:    Abdominal Pain: Based on the patient's signs and symptoms, I considered abdominal aortic aneurysm, small bowel obstruction, pancreatitis, acute cholecystitis, acute appendecitis, peptic ulcer disease, gastritis, colitis, endocrine disorders, irritable bowel syndrome and other differential diagnosis an etiology of the patient's abdominal pain.    All labs were reviewed and interpreted by me.  CT scan radiology impression was interpreted by me.    MDM  Patient's workup was negative for any significant acute findings.  Most of her symptoms have been intermittently chronic in nature.  I sent a request for an appointment with Dr. Polo, gastroenterology.  Patient declined nausea medicine.          Patient Care Considerations:    ANTIBIOTICS: I considered prescribing antibiotics as an outpatient however no bacterial focus of infection was found.      Consultants/Shared Management Plan:    None    Social Determinants of Health:    Patient is independent, reliable, and has access to care.       Disposition and Care Coordination:    Discharged: The patient is suitable and stable for discharge with no need for consideration of admission.    I have explained the patient´s condition, diagnoses and treatment  plan based on the information available to me at this time. I have answered questions and addressed any concerns. The patient has a good  understanding of the patient´s diagnosis, condition, and treatment plan as can be expected at this point. The vital signs have been stable. The patient´s condition is stable and appropriate for discharge from the emergency department.      The patient will pursue further outpatient evaluation with the primary care physician or other designated or consulting physician as outlined in the discharge instructions. They are agreeable to this plan of care and follow-up instructions have been explained in detail. The patient has received these instructions in written format and has expressed an understanding of the discharge instructions. The patient is aware that any significant change in condition or worsening of symptoms should prompt an immediate return to this or the closest emergency department or call to 911.    Final diagnoses:   Constipation, unspecified constipation type   Nausea and vomiting, unspecified vomiting type   Generalized abdominal pain        ED Disposition       ED Disposition   Discharge    Condition   Stable    Comment   --               This medical record created using voice recognition software.             Zaid Jaramillo DO  10/30/24 2673

## 2024-10-31 RX ORDER — ONDANSETRON 4 MG/1
4 TABLET, ORALLY DISINTEGRATING ORAL EVERY 8 HOURS PRN
Qty: 30 TABLET | Refills: 1 | Status: SHIPPED | OUTPATIENT
Start: 2024-10-31

## 2024-11-05 ENCOUNTER — OFFICE VISIT (OUTPATIENT)
Dept: GASTROENTEROLOGY | Facility: CLINIC | Age: 29
End: 2024-11-05
Payer: COMMERCIAL

## 2024-11-05 VITALS
SYSTOLIC BLOOD PRESSURE: 146 MMHG | HEART RATE: 92 BPM | DIASTOLIC BLOOD PRESSURE: 82 MMHG | WEIGHT: 158.9 LBS | OXYGEN SATURATION: 100 % | HEIGHT: 67 IN | BODY MASS INDEX: 24.94 KG/M2

## 2024-11-05 DIAGNOSIS — K59.09 CHRONIC CONSTIPATION: ICD-10-CM

## 2024-11-05 DIAGNOSIS — K62.5 RECTAL BLEEDING: ICD-10-CM

## 2024-11-05 DIAGNOSIS — R11.2 NAUSEA AND VOMITING, UNSPECIFIED VOMITING TYPE: Primary | ICD-10-CM

## 2024-11-05 DIAGNOSIS — R10.11 BILATERAL UPPER ABDOMINAL DISCOMFORT: ICD-10-CM

## 2024-11-05 DIAGNOSIS — K21.9 GASTROESOPHAGEAL REFLUX DISEASE, UNSPECIFIED WHETHER ESOPHAGITIS PRESENT: ICD-10-CM

## 2024-11-05 DIAGNOSIS — R10.12 BILATERAL UPPER ABDOMINAL DISCOMFORT: ICD-10-CM

## 2024-11-05 PROCEDURE — 1160F RVW MEDS BY RX/DR IN RCRD: CPT

## 2024-11-05 PROCEDURE — 99214 OFFICE O/P EST MOD 30 MIN: CPT

## 2024-11-05 PROCEDURE — 1159F MED LIST DOCD IN RCRD: CPT

## 2024-11-05 RX ORDER — POLYETHYLENE GLYCOL 3350, SODIUM SULFATE, SODIUM CHLORIDE, POTASSIUM CHLORIDE, ASCORBIC ACID, SODIUM ASCORBATE 140-9-5.2G
1 KIT ORAL TAKE AS DIRECTED
Qty: 1 EACH | Refills: 0 | Status: SHIPPED | OUTPATIENT
Start: 2024-11-05 | End: 2024-11-06

## 2024-11-05 RX ORDER — FLUCONAZOLE 150 MG/1
150 TABLET ORAL
COMMUNITY
Start: 2024-10-31

## 2024-11-05 RX ORDER — CLINDAMYCIN HYDROCHLORIDE 300 MG/1
300 CAPSULE ORAL 3 TIMES DAILY
COMMUNITY
Start: 2024-10-31

## 2024-11-05 NOTE — H&P (VIEW-ONLY)
Chief Complaint  Nausea, Vomiting, Abdominal Pain (RUQ pain ), Constipation (1x a week ), and Rectal Bleeding    Areli Hernandez is a 29 y.o. female who presents to [facility] on referral from Zaid Jaramillo DO for a gastroenterology evaluation of ED follow up.      History of Present Illness  New patient presents to the office for ED follow up. Evaluated to [facility] ED 10/30/24 for abdominal pain, nausea, vomiting, and constipation. CT normal. Mild but stable anemia noted.  Patient reports long history of chronic constipation and diffuse abdominal pain.  She can go several days without a bowel movement.  Previously prescribed Linzess 72 but had diarrhea and increased abdominal cramping.  She does have right red blood in bowel movements as well as rectal pain when having a bowel movement.  She does struggle with some indigestion that can be relieved with omeprazole.  Frequently has nausea.    CT abdomen/pelvis w/ contrast 10/30/24 - normal    Past Medical History:   Diagnosis Date    ADHD (attention deficit hyperactivity disorder) 2008    Anxiety 2013    Drug abuse     Migraines        History reviewed. No pertinent surgical history.      Current Outpatient Medications:     clindamycin (CLEOCIN) 300 MG capsule, Take 1 capsule by mouth 3 (Three) Times a Day., Disp: , Rfl:     fluconazole (DIFLUCAN) 150 MG tablet, Take 1 tablet by mouth Every 7 (Seven) Days., Disp: , Rfl:     Levonorgestrel (Mirena, 52 MG,) 20 MCG/DAY intrauterine device IUD, To be inserted one time by prescriber. Route intrauterine., Disp: , Rfl:     ondansetron ODT (ZOFRAN-ODT) 4 MG disintegrating tablet, Place 1 tablet on the tongue Every 8 (Eight) Hours As Needed for Nausea or Vomiting., Disp: 30 tablet, Rfl: 1    Rimegepant Sulfate (Nurtec) 75 MG tablet dispersible tablet, Take 1 tablet by mouth Every Other Day., Disp: 16 tablet, Rfl: 1    Sublocade 100 MG/0.5ML injection, INJECT 100MG SUBCUTANEOUSLY ONCE  "MONTHLY, Disp: , Rfl:     atomoxetine (STRATTERA) 40 MG capsule, , Disp: , Rfl:     linaclotide (Linzess) 72 MCG capsule capsule, Take 1 capsule by mouth Every Morning Before Breakfast for 90 days., Disp: 30 capsule, Rfl: 5    PEG-KCl-NaCl-NaSulf-Na Asc-C (Plenvu) 140 g reconstituted solution solution, Take 140 g by mouth Take As Directed for 1 day. Attn: Pharmacist: please see notes for coupon code., Disp: 1 each, Rfl: 0    Sublocade 300 MG/1.5ML solution prefilled syringe, INJECT 300MG SUBCUTANEOUSLY ONCE MONTHLY (Patient not taking: Reported on 11/5/2024), Disp: , Rfl:      No Known Allergies    Family History   Problem Relation Age of Onset    Cancer Mother         Breast cancer    Colon cancer Neg Hx         Social History     Social History Narrative    Not on file       Immunization:  Immunization History   Administered Date(s) Administered    COVID-19 (PFIZER) Purple Cap Monovalent 11/02/2021    influenza Split 10/14/2009        Objective     Vital Signs:   /82 (BP Location: Left arm, Patient Position: Sitting, Cuff Size: Adult)   Pulse 92   Ht 170.2 cm (67.01\")   Wt 72.1 kg (158 lb 14.4 oz)   SpO2 100%   BMI 24.88 kg/m²       Physical Exam  Constitutional:       Appearance: Normal appearance. She is normal weight.   HENT:      Head: Normocephalic and atraumatic.      Nose: Nose normal.   Pulmonary:      Effort: Pulmonary effort is normal.   Skin:     General: Skin is warm and dry.   Neurological:      Mental Status: She is alert and oriented to person, place, and time. Mental status is at baseline.   Psychiatric:         Mood and Affect: Mood normal.         Behavior: Behavior normal.         Thought Content: Thought content normal.         Judgment: Judgment normal.         Result Review :     CBC w/diff          11/18/2023    11:40 11/22/2023    12:41 10/30/2024    11:18   CBC w/Diff   WBC 7.68  7.32  5.26    RBC 4.21  4.10  4.11    Hemoglobin 12.2  11.9  11.9    Hematocrit 36.3  36.0  36.7  "   MCV 86.2  87.8  89.3    MCH 29.0  29.0  29.0    MCHC 33.6  33.1  32.4    RDW 13.2  13.3  12.5    Platelets 306  322  204    Neutrophil Rel % 71.1  62.7  72.3    Immature Granulocyte Rel % 0.1  0.1  0.2    Lymphocyte Rel % 21.2  29.5  18.3    Monocyte Rel % 7.2  6.8  8.0    Eosinophil Rel % 0.0  0.5  0.8    Basophil Rel % 0.4  0.4  0.4      CMP          11/18/2023    11:40 11/22/2023    12:41 10/30/2024    11:18   CMP   Glucose 107  127  97    BUN 9  8  11    Creatinine 1.02  0.90  0.77    EGFR 78.0  90.6  108.6    Sodium 140  141  138    Potassium 3.4  3.1  4.4    Chloride 101  99  103    Calcium 9.3  9.5  9.3    Total Protein 7.9  8.1  7.6    Albumin 5.0  5.0  4.7    Globulin 2.9  3.1  2.9    Total Bilirubin 0.6  0.6  0.3    Alkaline Phosphatase 68  74  54    AST (SGOT) 15  19  16    ALT (SGPT) 18  18  14    Albumin/Globulin Ratio 1.7  1.6  1.6    BUN/Creatinine Ratio 8.8  8.9  14.3    Anion Gap 13.4  16.5  7.5        Lipase   Lipase   Date Value Ref Range Status   10/30/2024 30 13 - 60 U/L Final               Assessment and Plan    Diagnoses and all orders for this visit:    1. Nausea and vomiting, unspecified vomiting type (Primary)  -     Case Request; Standing  -     Verify NPO; Standing  -     Verify Bowel Prep Was Successful; Standing  -     Give Tap Water Enema If Bowel Prep Insufficient; Standing  -     Case Request    2. Gastroesophageal reflux disease, unspecified whether esophagitis present  -     Case Request; Standing  -     Verify NPO; Standing  -     Verify Bowel Prep Was Successful; Standing  -     Give Tap Water Enema If Bowel Prep Insufficient; Standing  -     Case Request    3. Bilateral upper abdominal discomfort  -     Case Request; Standing  -     Verify NPO; Standing  -     Verify Bowel Prep Was Successful; Standing  -     Give Tap Water Enema If Bowel Prep Insufficient; Standing  -     Case Request    4. Chronic constipation  -     Case Request; Standing  -     Verify NPO; Standing  -      Verify Bowel Prep Was Successful; Standing  -     Give Tap Water Enema If Bowel Prep Insufficient; Standing  -     Case Request    5. Rectal bleeding  -     Case Request; Standing  -     Verify NPO; Standing  -     Verify Bowel Prep Was Successful; Standing  -     Give Tap Water Enema If Bowel Prep Insufficient; Standing  -     Case Request    Other orders  -     linaclotide (Linzess) 72 MCG capsule capsule; Take 1 capsule by mouth Every Morning Before Breakfast for 90 days.  Dispense: 30 capsule; Refill: 5  -     PEG-KCl-NaCl-NaSulf-Na Asc-C (Plenvu) 140 g reconstituted solution solution; Take 140 g by mouth Take As Directed for 1 day. Attn: Pharmacist: please see notes for coupon code.  Dispense: 1 each; Refill: 0    29-year-old new patient presents to the office for ED follow-up with a long history of chronic constipation, nausea, diffuse abdominal pain, and GERD.  Patient does report increased rectal pain and blood in stool.  Patient will start Linzess 72 to better control constipation, she will call the office if this is too strong and may consider changing to Ibsrela.  She will continue omeprazole as needed to control indigestion type symptoms.  She will proceed with EGD and colonoscopy for further evaluation.  Denies cardiopulmonary history.  Patient is agreeable to plan call the office any questions or concerns.    EGD/COLONOSCOPY Surgical Risk and Benefits: Possible risk/complications, benefits, and alternatives to surgical or invasive procedure have been explained to patient and/or legal guardian. Risks include bleeding, infection, and perforation. Patient has been evaluated and can tolerate anesthesia and/or sedation. Risk, benefits, and alternatives to anesthesia and sedation have been explained to patient and/or legal guardian.     Follow Up   Return for follow up after procedure.  Patient was given instructions and counseling regarding her condition or for health maintenance advice. Please see specific  information pulled into the AVS if appropriate.

## 2024-11-05 NOTE — PROGRESS NOTES
Chief Complaint  Nausea, Vomiting, Abdominal Pain (RUQ pain ), Constipation (1x a week ), and Rectal Bleeding    Areli Hernandez is a 29 y.o. female who presents to [facility] on referral from Zaid Jaramillo DO for a gastroenterology evaluation of ED follow up.      History of Present Illness  New patient presents to the office for ED follow up. Evaluated to [facility] ED 10/30/24 for abdominal pain, nausea, vomiting, and constipation. CT normal. Mild but stable anemia noted.  Patient reports long history of chronic constipation and diffuse abdominal pain.  She can go several days without a bowel movement.  Previously prescribed Linzess 72 but had diarrhea and increased abdominal cramping.  She does have right red blood in bowel movements as well as rectal pain when having a bowel movement.  She does struggle with some indigestion that can be relieved with omeprazole.  Frequently has nausea.    CT abdomen/pelvis w/ contrast 10/30/24 - normal    Past Medical History:   Diagnosis Date    ADHD (attention deficit hyperactivity disorder) 2008    Anxiety 2013    Drug abuse     Migraines        History reviewed. No pertinent surgical history.      Current Outpatient Medications:     clindamycin (CLEOCIN) 300 MG capsule, Take 1 capsule by mouth 3 (Three) Times a Day., Disp: , Rfl:     fluconazole (DIFLUCAN) 150 MG tablet, Take 1 tablet by mouth Every 7 (Seven) Days., Disp: , Rfl:     Levonorgestrel (Mirena, 52 MG,) 20 MCG/DAY intrauterine device IUD, To be inserted one time by prescriber. Route intrauterine., Disp: , Rfl:     ondansetron ODT (ZOFRAN-ODT) 4 MG disintegrating tablet, Place 1 tablet on the tongue Every 8 (Eight) Hours As Needed for Nausea or Vomiting., Disp: 30 tablet, Rfl: 1    Rimegepant Sulfate (Nurtec) 75 MG tablet dispersible tablet, Take 1 tablet by mouth Every Other Day., Disp: 16 tablet, Rfl: 1    Sublocade 100 MG/0.5ML injection, INJECT 100MG SUBCUTANEOUSLY ONCE  "MONTHLY, Disp: , Rfl:     atomoxetine (STRATTERA) 40 MG capsule, , Disp: , Rfl:     linaclotide (Linzess) 72 MCG capsule capsule, Take 1 capsule by mouth Every Morning Before Breakfast for 90 days., Disp: 30 capsule, Rfl: 5    PEG-KCl-NaCl-NaSulf-Na Asc-C (Plenvu) 140 g reconstituted solution solution, Take 140 g by mouth Take As Directed for 1 day. Attn: Pharmacist: please see notes for coupon code., Disp: 1 each, Rfl: 0    Sublocade 300 MG/1.5ML solution prefilled syringe, INJECT 300MG SUBCUTANEOUSLY ONCE MONTHLY (Patient not taking: Reported on 11/5/2024), Disp: , Rfl:      No Known Allergies    Family History   Problem Relation Age of Onset    Cancer Mother         Breast cancer    Colon cancer Neg Hx         Social History     Social History Narrative    Not on file       Immunization:  Immunization History   Administered Date(s) Administered    COVID-19 (PFIZER) Purple Cap Monovalent 11/02/2021    influenza Split 10/14/2009        Objective     Vital Signs:   /82 (BP Location: Left arm, Patient Position: Sitting, Cuff Size: Adult)   Pulse 92   Ht 170.2 cm (67.01\")   Wt 72.1 kg (158 lb 14.4 oz)   SpO2 100%   BMI 24.88 kg/m²       Physical Exam  Constitutional:       Appearance: Normal appearance. She is normal weight.   HENT:      Head: Normocephalic and atraumatic.      Nose: Nose normal.   Pulmonary:      Effort: Pulmonary effort is normal.   Skin:     General: Skin is warm and dry.   Neurological:      Mental Status: She is alert and oriented to person, place, and time. Mental status is at baseline.   Psychiatric:         Mood and Affect: Mood normal.         Behavior: Behavior normal.         Thought Content: Thought content normal.         Judgment: Judgment normal.         Result Review :     CBC w/diff          11/18/2023    11:40 11/22/2023    12:41 10/30/2024    11:18   CBC w/Diff   WBC 7.68  7.32  5.26    RBC 4.21  4.10  4.11    Hemoglobin 12.2  11.9  11.9    Hematocrit 36.3  36.0  36.7  "   MCV 86.2  87.8  89.3    MCH 29.0  29.0  29.0    MCHC 33.6  33.1  32.4    RDW 13.2  13.3  12.5    Platelets 306  322  204    Neutrophil Rel % 71.1  62.7  72.3    Immature Granulocyte Rel % 0.1  0.1  0.2    Lymphocyte Rel % 21.2  29.5  18.3    Monocyte Rel % 7.2  6.8  8.0    Eosinophil Rel % 0.0  0.5  0.8    Basophil Rel % 0.4  0.4  0.4      CMP          11/18/2023    11:40 11/22/2023    12:41 10/30/2024    11:18   CMP   Glucose 107  127  97    BUN 9  8  11    Creatinine 1.02  0.90  0.77    EGFR 78.0  90.6  108.6    Sodium 140  141  138    Potassium 3.4  3.1  4.4    Chloride 101  99  103    Calcium 9.3  9.5  9.3    Total Protein 7.9  8.1  7.6    Albumin 5.0  5.0  4.7    Globulin 2.9  3.1  2.9    Total Bilirubin 0.6  0.6  0.3    Alkaline Phosphatase 68  74  54    AST (SGOT) 15  19  16    ALT (SGPT) 18  18  14    Albumin/Globulin Ratio 1.7  1.6  1.6    BUN/Creatinine Ratio 8.8  8.9  14.3    Anion Gap 13.4  16.5  7.5        Lipase   Lipase   Date Value Ref Range Status   10/30/2024 30 13 - 60 U/L Final               Assessment and Plan    Diagnoses and all orders for this visit:    1. Nausea and vomiting, unspecified vomiting type (Primary)  -     Case Request; Standing  -     Verify NPO; Standing  -     Verify Bowel Prep Was Successful; Standing  -     Give Tap Water Enema If Bowel Prep Insufficient; Standing  -     Case Request    2. Gastroesophageal reflux disease, unspecified whether esophagitis present  -     Case Request; Standing  -     Verify NPO; Standing  -     Verify Bowel Prep Was Successful; Standing  -     Give Tap Water Enema If Bowel Prep Insufficient; Standing  -     Case Request    3. Bilateral upper abdominal discomfort  -     Case Request; Standing  -     Verify NPO; Standing  -     Verify Bowel Prep Was Successful; Standing  -     Give Tap Water Enema If Bowel Prep Insufficient; Standing  -     Case Request    4. Chronic constipation  -     Case Request; Standing  -     Verify NPO; Standing  -      Verify Bowel Prep Was Successful; Standing  -     Give Tap Water Enema If Bowel Prep Insufficient; Standing  -     Case Request    5. Rectal bleeding  -     Case Request; Standing  -     Verify NPO; Standing  -     Verify Bowel Prep Was Successful; Standing  -     Give Tap Water Enema If Bowel Prep Insufficient; Standing  -     Case Request    Other orders  -     linaclotide (Linzess) 72 MCG capsule capsule; Take 1 capsule by mouth Every Morning Before Breakfast for 90 days.  Dispense: 30 capsule; Refill: 5  -     PEG-KCl-NaCl-NaSulf-Na Asc-C (Plenvu) 140 g reconstituted solution solution; Take 140 g by mouth Take As Directed for 1 day. Attn: Pharmacist: please see notes for coupon code.  Dispense: 1 each; Refill: 0    29-year-old new patient presents to the office for ED follow-up with a long history of chronic constipation, nausea, diffuse abdominal pain, and GERD.  Patient does report increased rectal pain and blood in stool.  Patient will start Linzess 72 to better control constipation, she will call the office if this is too strong and may consider changing to Ibsrela.  She will continue omeprazole as needed to control indigestion type symptoms.  She will proceed with EGD and colonoscopy for further evaluation.  Denies cardiopulmonary history.  Patient is agreeable to plan call the office any questions or concerns.    EGD/COLONOSCOPY Surgical Risk and Benefits: Possible risk/complications, benefits, and alternatives to surgical or invasive procedure have been explained to patient and/or legal guardian. Risks include bleeding, infection, and perforation. Patient has been evaluated and can tolerate anesthesia and/or sedation. Risk, benefits, and alternatives to anesthesia and sedation have been explained to patient and/or legal guardian.     Follow Up   Return for follow up after procedure.  Patient was given instructions and counseling regarding her condition or for health maintenance advice. Please see specific  information pulled into the AVS if appropriate.

## 2024-11-06 ENCOUNTER — PATIENT ROUNDING (BHMG ONLY) (OUTPATIENT)
Dept: GASTROENTEROLOGY | Facility: CLINIC | Age: 29
End: 2024-11-06
Payer: COMMERCIAL

## 2024-11-06 NOTE — PROGRESS NOTES
"11/6/2024      Hello, may I speak with Areli Hernandez     My name is Cathy. I am calling from [facility] [place]. I show that you had a recent visit with ANDREY Huerta.    Before we get started may I verify your date of birth? 1995    I am calling to officially welcome you to our practice and ask about your recent visit. Is this a good time to talk? Yes     Tell me about your visit with us. What things went well? \"Dinora was Awesome & very informative\"     We strive to ensure that we protect your safety and privacy. Is there anything we could have done to improve this during your visit?    No     We're always looking for ways to make our patients' experiences even better. Do you have recommendations on ways we may improve?  No     Overall were you satisfied with your first visit to our practice?   Yes     I appreciate you taking the time to speak with me today. Is there anything else I can do for you?  No     I am glad to hear that you had a very good visit and I appreciate you taking the time to provide feedback on this call. We would greatly appreciate you filling out a survey if you receive one in the mail, email or text. This is a great opportunity to provide any additional feedback that you may think of after this call as well.       Thank you, and have a great day.   "

## 2024-11-08 ENCOUNTER — TELEPHONE (OUTPATIENT)
Dept: INTERNAL MEDICINE | Age: 29
End: 2024-11-08
Payer: COMMERCIAL

## 2024-11-08 RX ORDER — RIMEGEPANT SULFATE 75 MG/75MG
75 TABLET, ORALLY DISINTEGRATING ORAL EVERY OTHER DAY
Qty: 16 TABLET | Refills: 1 | Status: SHIPPED | OUTPATIENT
Start: 2024-11-08

## 2024-11-08 NOTE — TELEPHONE ENCOUNTER
Caller: Areli Hernandez    Relationship: Self    Best call back number: [phone]    Requested Prescriptions:   Requested Prescriptions     Pending Prescriptions Disp Refills    Rimegepant Sulfate (Nurtec) 75 MG tablet dispersible tablet 16 tablet 1     Sig: Take 1 tablet by mouth Every Other Day.        Pharmacy where request should be sent: [id number] [place] [facility] [address] AT [facility] [phone] [facility] [phone]      Last office visit with prescribing clinician: 2/6/2024   Last telemedicine visit with prescribing clinician: Visit date not found   Next office visit with prescribing clinician: Visit date not found     Additional details provided by patient: PATIENT IS OUT OF THIS MEDICATION COMPLETELY. SHE WOULD LIKE TO HAVE THIS TODAY. PLEASE SEND NEW PRESCRIPTION TO PHARMACY ASAP TODAY AS PATIENT IS HAVING A MIGRAINE TODAY.   IF THIS IS NOT POSSIBLE TODAY, PATIENT CAN  SAMPLES FROM YOUR OFFICE BEFORE END OF DAY TODAY. PLEASE CALL PATIENT IF SAMPLES ARE AVAILABLE.    Does the patient have less than a 3 day supply:  [x] Yes  [] No    Would you like a call back once the refill request has been completed: [] Yes [] No    If the office needs to give you a call back, can they leave a voicemail: [] Yes [] No    Kavon Wilkerson Rep   11/08/24 13:45 EST

## 2024-11-12 NOTE — PRE-PROCEDURE INSTRUCTIONS
"Instructed on date and arrival time of 1300. Instructed that arrival time is not their procedure time but allows time to prepare for procedure.  Come to entrance \"C\". Must have  over age [address] to drive home.  May have two visitors; however, children under 12 must stay in waiting room.  Discussed clear liquid diet (no red or purple), bowel prep, and NPO.  May take medications as usual except for blood thinners, diabetic medications, and weight loss medications.  Verbalized understanding of instructions given.  Instructed to call for questions or concerns.  "

## 2024-11-13 ENCOUNTER — ANESTHESIA EVENT (OUTPATIENT)
Dept: GASTROENTEROLOGY | Facility: HOSPITAL | Age: 29
End: 2024-11-13
Payer: COMMERCIAL

## 2024-11-13 NOTE — ANESTHESIA PREPROCEDURE EVALUATION
Anesthesia Evaluation     Nursing notes reviewed                Airway   Mallampati: I  TM distance: >3 FB  Neck ROM: full  No difficulty expected  Dental          Pulmonary - normal exam    breath sounds clear to auscultation  (+) a smoker (Vape),  Cardiovascular - normal exam    ECG reviewed  Rhythm: regular  Rate: normal      ROS comment: EKG 07/2022  Sinus or ectopic atrial rhythm  RSR' in V1 or V2, probably normal variant  No previous ECG available for comparison    Neuro/Psych  (+) headaches, psychiatric history Anxiety, Depression and ADHD  GI/Hepatic/Renal/Endo    (+) GERD, GI bleeding (RECTAL BLEEDING)     Musculoskeletal     Abdominal    Substance History      OB/GYN      Comment: IUD        Other        ROS/Med Hx Other: EKG:  HEART RATE= 92  bpm  RR Interval= 652  ms  CO Interval= 143  ms  P Horizontal Axis= -8  deg  P Front Axis= 52  deg  QRSD Interval= 116  ms  QT Interval= 384  ms  QTcB= 476  ms  QRS Axis= 40  deg  T Wave Axis= 16  deg  - ABNORMAL ECG -  Sinus rhythm  Incomplete right bundle branch block  Borderline prolonged QT interval  Electronically Signed By: Rosie Laura ([facility]) 29-Nov-2023 00:19:19  Date and Time of Study: 2023-11-22 12:25:42      Most Recent  WBC 5.26  RBC 4.11  Hemoglobin 11.9 (L)  Hematocrit: 36.7                       Anesthesia Plan    ASA 2     general     (Total IV Anesthesia    Patient understands anesthesia not responsible for dental damage.  )  intravenous induction     Anesthetic plan, risks, benefits, and alternatives have been provided, discussed and informed consent has been obtained with: patient.    Plan discussed with CRNA.      CODE STATUS:

## 2024-11-14 ENCOUNTER — ANESTHESIA (OUTPATIENT)
Dept: GASTROENTEROLOGY | Facility: HOSPITAL | Age: 29
End: 2024-11-14
Payer: COMMERCIAL

## 2024-11-14 ENCOUNTER — HOSPITAL ENCOUNTER (OUTPATIENT)
Facility: HOSPITAL | Age: 29
Setting detail: HOSPITAL OUTPATIENT SURGERY
Discharge: HOME | End: 2024-11-14
Attending: INTERNAL MEDICINE | Admitting: INTERNAL MEDICINE
Payer: COMMERCIAL

## 2024-11-14 VITALS
SYSTOLIC BLOOD PRESSURE: 109 MMHG | HEART RATE: 79 BPM | TEMPERATURE: 98.6 F | BODY MASS INDEX: 23.61 KG/M2 | OXYGEN SATURATION: 97 % | DIASTOLIC BLOOD PRESSURE: 66 MMHG | WEIGHT: 150.79 LBS | RESPIRATION RATE: 16 BRPM

## 2024-11-14 DIAGNOSIS — K59.09 CHRONIC CONSTIPATION: ICD-10-CM

## 2024-11-14 DIAGNOSIS — K62.5 RECTAL BLEEDING: ICD-10-CM

## 2024-11-14 DIAGNOSIS — K21.9 GASTROESOPHAGEAL REFLUX DISEASE, UNSPECIFIED WHETHER ESOPHAGITIS PRESENT: ICD-10-CM

## 2024-11-14 DIAGNOSIS — R10.11 BILATERAL UPPER ABDOMINAL DISCOMFORT: ICD-10-CM

## 2024-11-14 DIAGNOSIS — R11.2 NAUSEA AND VOMITING, UNSPECIFIED VOMITING TYPE: ICD-10-CM

## 2024-11-14 DIAGNOSIS — R10.12 BILATERAL UPPER ABDOMINAL DISCOMFORT: ICD-10-CM

## 2024-11-14 LAB — B-HCG UR QL: NEGATIVE

## 2024-11-14 PROCEDURE — 25010000002 PROPOFOL 10 MG/ML EMULSION

## 2024-11-14 PROCEDURE — 43239 EGD BIOPSY SINGLE/MULTIPLE: CPT | Performed by: INTERNAL MEDICINE

## 2024-11-14 PROCEDURE — 88305 TISSUE EXAM BY PATHOLOGIST: CPT | Performed by: INTERNAL MEDICINE

## 2024-11-14 PROCEDURE — 25010000002 LIDOCAINE PF 2% 2 % SOLUTION

## 2024-11-14 PROCEDURE — 45378 DIAGNOSTIC COLONOSCOPY: CPT | Performed by: INTERNAL MEDICINE

## 2024-11-14 PROCEDURE — 81025 URINE PREGNANCY TEST: CPT | Performed by: INTERNAL MEDICINE

## 2024-11-14 RX ORDER — DEXMEDETOMIDINE HYDROCHLORIDE 100 UG/ML
INJECTION, SOLUTION INTRAVENOUS AS NEEDED
Status: DISCONTINUED | OUTPATIENT
Start: 2024-11-14 | End: 2024-11-14 | Stop reason: SURG

## 2024-11-14 RX ORDER — SODIUM CHLORIDE, SODIUM LACTATE, POTASSIUM CHLORIDE, CALCIUM CHLORIDE 600; 310; 30; 20 MG/100ML; MG/100ML; MG/100ML; MG/100ML
30 INJECTION, SOLUTION INTRAVENOUS CONTINUOUS
Status: DISCONTINUED | OUTPATIENT
Start: 2024-11-14 | End: 2024-11-14 | Stop reason: HOSPADM

## 2024-11-14 RX ORDER — LIDOCAINE HYDROCHLORIDE 20 MG/ML
INJECTION, SOLUTION EPIDURAL; INFILTRATION; INTRACAUDAL; PERINEURAL AS NEEDED
Status: DISCONTINUED | OUTPATIENT
Start: 2024-11-14 | End: 2024-11-14 | Stop reason: SURG

## 2024-11-14 RX ORDER — PROPOFOL 10 MG/ML
VIAL (ML) INTRAVENOUS AS NEEDED
Status: DISCONTINUED | OUTPATIENT
Start: 2024-11-14 | End: 2024-11-14 | Stop reason: SURG

## 2024-11-14 RX ADMIN — PROPOFOL 100 MG: 10 INJECTION, EMULSION INTRAVENOUS at 15:03

## 2024-11-14 RX ADMIN — DEXMEDETOMIDINE HYDROCHLORIDE 20 MCG: 100 INJECTION, SOLUTION, CONCENTRATE INTRAVENOUS at 15:06

## 2024-11-14 RX ADMIN — PROPOFOL 350 MCG/KG/MIN: 10 INJECTION, EMULSION INTRAVENOUS at 15:04

## 2024-11-14 RX ADMIN — PROPOFOL 100 MG: 10 INJECTION, EMULSION INTRAVENOUS at 14:58

## 2024-11-14 RX ADMIN — PROPOFOL 200 MCG/KG/MIN: 10 INJECTION, EMULSION INTRAVENOUS at 14:59

## 2024-11-14 RX ADMIN — LIDOCAINE HYDROCHLORIDE 60 MG: 20 INJECTION, SOLUTION INTRAVENOUS at 14:58

## 2024-11-14 RX ADMIN — PROPOFOL 70 MG: 10 INJECTION, EMULSION INTRAVENOUS at 15:19

## 2024-11-14 NOTE — ANESTHESIA POSTPROCEDURE EVALUATION
Patient: Areli Hernandez    Procedure Summary       Date: 11/14/24 Room / Location: [facility] END[facility] 2 / [facility]    Anesthesia Start: 1454 Anesthesia Stop: 1537    Procedures:       ESOPHAGOGASTRODUODENOSCOPY with biopsies      COLONOSCOPY Diagnosis:       Nausea and vomiting, unspecified vomiting type      Gastroesophageal reflux disease, unspecified whether esophagitis present      Bilateral upper abdominal discomfort      Chronic constipation      Rectal bleeding      (Nausea and vomiting, unspecified vomiting type [R11.2])      (Gastroesophageal reflux disease, unspecified whether esophagitis present [K21.9])      (Bilateral upper abdominal discomfort [R10.11, R10.12])      (Chronic constipation [K59.09])      (Rectal bleeding [K62.5])    Surgeons: Ronnell Polo MD Provider: Lazara Diaz CRNA    Anesthesia Type: general ASA Status: 2            Anesthesia Type: general    Vitals  Vitals Value Taken Time   /71 11/14/24 1538   Temp     Pulse 80 11/14/24 1538   Resp     SpO2 97 % 11/14/24 1538   Vitals shown include unfiled device data.        Post Anesthesia Care and Evaluation    Patient location during evaluation: bedside  Patient participation: complete - patient participated  Level of consciousness: awake  Pain management: adequate    Airway patency: patent  Anesthetic complications: No anesthetic complications  PONV Status: controlled  Cardiovascular status: acceptable and stable  Respiratory status: acceptable

## 2024-11-18 LAB
CYTO UR: NORMAL
LAB AP CASE REPORT: NORMAL
LAB AP CLINICAL INFORMATION: NORMAL
PATH REPORT.FINAL DX SPEC: NORMAL
PATH REPORT.GROSS SPEC: NORMAL

## 2024-11-20 RX ORDER — ONDANSETRON 4 MG/1
4 TABLET, ORALLY DISINTEGRATING ORAL EVERY 8 HOURS PRN
Qty: 30 TABLET | Refills: 1 | Status: SHIPPED | OUTPATIENT
Start: 2024-11-20

## 2025-01-15 NOTE — TELEPHONE ENCOUNTER
Caller: Areli Hernandez    Relationship: Self    Best call back number:     [phone]       Requested Prescriptions:   Requested Prescriptions     Pending Prescriptions Disp Refills    rimegepant sulfate (Nurtec) 75 MG tablet 16 tablet 1     Sig: Take 1 tablet by mouth Every Other Day.        Pharmacy where request should be sent: [id number] [place] - [address] AT [facility] [phone] [facility] [phone]      Last office visit with prescribing clinician: 2/6/2024   Last telemedicine visit with prescribing clinician: Visit date not found   Next office visit with prescribing clinician: Visit date not found     Additional details provided by patient:     Does the patient have less than a 3 day supply:  [x] Yes  [] No    Would you like a call back once the refill request has been completed: [] Yes [] No    If the office needs to give you a call back, can they leave a voicemail: [] Yes [] No    Kavon Odom Rep   01/15/25 16:29 EST

## 2025-03-07 NOTE — TELEPHONE ENCOUNTER
Caller: Areli Hernandez    Relationship: Self    Best call back number: [phone]    Requested Prescriptions:   Requested Prescriptions     Pending Prescriptions Disp Refills    rimegepant sulfate ODT (Nurtec) 75 MG disintegrating tablet 16 tablet 0     Si tablet Every Other Day.        Pharmacy where request should be sent: [id number] [place] - [address] AT [facility] [phone] [facility] [phone]      Last office visit with prescribing clinician: 2024   Last telemedicine visit with prescribing clinician: Visit date not found   Next office visit with prescribing clinician: Visit date not found     Does the patient have less than a 3 day supply:  [x] Yes  [] No    Would you like a call back once the refill request has been completed: [x] Yes [] No    If the office needs to give you a call back, can they leave a voicemail: [x] Yes [] No    Kavon Collier Rep   25 14:45 EST

## 2025-03-10 ENCOUNTER — TELEPHONE (OUTPATIENT)
Dept: INTERNAL MEDICINE | Age: 30
End: 2025-03-10
Payer: COMMERCIAL

## 2025-03-27 ENCOUNTER — OFFICE VISIT (OUTPATIENT)
Dept: INTERNAL MEDICINE | Age: 30
End: 2025-03-27
Payer: COMMERCIAL

## 2025-03-27 VITALS
WEIGHT: 136 LBS | BODY MASS INDEX: 21.35 KG/M2 | DIASTOLIC BLOOD PRESSURE: 73 MMHG | HEART RATE: 84 BPM | HEIGHT: 67 IN | SYSTOLIC BLOOD PRESSURE: 108 MMHG

## 2025-03-27 DIAGNOSIS — Z13.29 SCREENING FOR THYROID DISORDER: ICD-10-CM

## 2025-03-27 DIAGNOSIS — G43.119 INTRACTABLE MIGRAINE WITH AURA WITHOUT STATUS MIGRAINOSUS: ICD-10-CM

## 2025-03-27 DIAGNOSIS — Z00.00 ANNUAL PHYSICAL EXAM: Primary | ICD-10-CM

## 2025-03-27 DIAGNOSIS — Z11.59 ENCOUNTER FOR HEPATITIS C SCREENING TEST FOR LOW RISK PATIENT: ICD-10-CM

## 2025-03-27 DIAGNOSIS — Z01.89 ROUTINE LAB DRAW: ICD-10-CM

## 2025-03-27 DIAGNOSIS — Z13.220 SCREENING FOR LIPID DISORDERS: ICD-10-CM

## 2025-03-27 DIAGNOSIS — E55.9 VITAMIN D DEFICIENCY: ICD-10-CM

## 2025-03-27 DIAGNOSIS — E53.8 VITAMIN B12 DEFICIENCY: ICD-10-CM

## 2025-03-27 DIAGNOSIS — R63.4 WEIGHT LOSS: ICD-10-CM

## 2025-03-27 DIAGNOSIS — R63.4 UNINTENTIONAL WEIGHT LOSS: ICD-10-CM

## 2025-03-27 LAB
ALBUMIN SERPL-MCNC: 4.7 G/DL (ref 3.5–5.2)
ALBUMIN/GLOB SERPL: 1.7 G/DL
ALP SERPL-CCNC: 44 U/L (ref 39–117)
ALT SERPL W P-5'-P-CCNC: 12 U/L (ref 1–33)
ANION GAP SERPL CALCULATED.3IONS-SCNC: 11 MMOL/L (ref 5–15)
AST SERPL-CCNC: 16 U/L (ref 1–32)
BASOPHILS # BLD AUTO: 0.03 10*3/MM3 (ref 0–0.2)
BASOPHILS NFR BLD AUTO: 0.9 % (ref 0–1.5)
BILIRUB SERPL-MCNC: 0.6 MG/DL (ref 0–1.2)
BUN SERPL-MCNC: 8 MG/DL (ref 6–20)
BUN/CREAT SERPL: 9.2 (ref 7–25)
CALCIUM SPEC-SCNC: 9.2 MG/DL (ref 8.6–10.5)
CHLORIDE SERPL-SCNC: 101 MMOL/L (ref 98–107)
CHOLEST SERPL-MCNC: 128 MG/DL (ref 0–200)
CO2 SERPL-SCNC: 25 MMOL/L (ref 22–29)
CREAT SERPL-MCNC: 0.87 MG/DL (ref 0.57–1)
DEPRECATED RDW RBC AUTO: 39.4 FL (ref 37–54)
EGFRCR SERPLBLD CKD-EPI 2021: 93.2 ML/MIN/1.73
EOSINOPHIL # BLD AUTO: 0.02 10*3/MM3 (ref 0–0.4)
EOSINOPHIL NFR BLD AUTO: 0.6 % (ref 0.3–6.2)
ERYTHROCYTE [DISTWIDTH] IN BLOOD BY AUTOMATED COUNT: 11.8 % (ref 12.3–15.4)
GLOBULIN UR ELPH-MCNC: 2.7 GM/DL
GLUCOSE SERPL-MCNC: 95 MG/DL (ref 65–99)
HCT VFR BLD AUTO: 36.6 % (ref 34–46.6)
HDLC SERPL-MCNC: 48 MG/DL (ref 40–60)
HGB BLD-MCNC: 11.8 G/DL (ref 12–15.9)
IMM GRANULOCYTES # BLD AUTO: 0.01 10*3/MM3 (ref 0–0.05)
IMM GRANULOCYTES NFR BLD AUTO: 0.3 % (ref 0–0.5)
LDLC SERPL CALC-MCNC: 68 MG/DL (ref 0–100)
LDLC/HDLC SERPL: 1.43 {RATIO}
LYMPHOCYTES # BLD AUTO: 1.42 10*3/MM3 (ref 0.7–3.1)
LYMPHOCYTES NFR BLD AUTO: 41.3 % (ref 19.6–45.3)
MCH RBC QN AUTO: 29.4 PG (ref 26.6–33)
MCHC RBC AUTO-ENTMCNC: 32.2 G/DL (ref 31.5–35.7)
MCV RBC AUTO: 91 FL (ref 79–97)
MONOCYTES # BLD AUTO: 0.31 10*3/MM3 (ref 0.1–0.9)
MONOCYTES NFR BLD AUTO: 9 % (ref 5–12)
NEUTROPHILS NFR BLD AUTO: 1.65 10*3/MM3 (ref 1.7–7)
NEUTROPHILS NFR BLD AUTO: 47.9 % (ref 42.7–76)
NRBC BLD AUTO-RTO: 0 /100 WBC (ref 0–0.2)
PLATELET # BLD AUTO: 215 10*3/MM3 (ref 140–450)
PMV BLD AUTO: 12 FL (ref 6–12)
POTASSIUM SERPL-SCNC: 3.9 MMOL/L (ref 3.5–5.2)
PROT SERPL-MCNC: 7.4 G/DL (ref 6–8.5)
RBC # BLD AUTO: 4.02 10*6/MM3 (ref 3.77–5.28)
SODIUM SERPL-SCNC: 137 MMOL/L (ref 136–145)
T4 FREE SERPL-MCNC: 1.28 NG/DL (ref 0.92–1.68)
TRIGL SERPL-MCNC: 56 MG/DL (ref 0–150)
TSH SERPL DL<=0.05 MIU/L-ACNC: 1.11 UIU/ML (ref 0.27–4.2)
VLDLC SERPL-MCNC: 12 MG/DL (ref 5–40)
WBC NRBC COR # BLD AUTO: 3.44 10*3/MM3 (ref 3.4–10.8)

## 2025-03-27 PROCEDURE — 84443 ASSAY THYROID STIM HORMONE: CPT

## 2025-03-27 PROCEDURE — 80053 COMPREHEN METABOLIC PANEL: CPT

## 2025-03-27 PROCEDURE — 85025 COMPLETE CBC W/AUTO DIFF WBC: CPT

## 2025-03-27 PROCEDURE — 84439 ASSAY OF FREE THYROXINE: CPT

## 2025-03-27 PROCEDURE — 86376 MICROSOMAL ANTIBODY EACH: CPT

## 2025-03-27 PROCEDURE — 80061 LIPID PANEL: CPT

## 2025-03-27 RX ORDER — ETONOGESTREL AND ETHINYL ESTRADIOL VAGINAL RING .015; .12 MG/D; MG/D
1 RING VAGINAL
COMMUNITY

## 2025-03-27 NOTE — ASSESSMENT & PLAN NOTE
Pap smear- 11/2024  Influenza- 11/2024  Covid 19 - declined- counseled   Tdap- 12/2023  Monthly SBE counseled Mom hx of breast cancer janine masectomy (40)  Alcohol use- never   Tobacco use- vape disposable about a month to go through

## 2025-03-27 NOTE — PROGRESS NOTES
Chief Complaint  Med Management (Nurtec ) and Labs Only (Pt wants labs done due to weight loss )    History of Present Illness  SUBJECTIVE  Areli Hernandez presents to [facility]     History of Present Illness  The patient presents for annual physical and evaluation of weight loss and migraines.    She has been experiencing significant weight loss over the past year, despite maintaining a healthy diet and regular exercise regimen. Her weight continues to decrease by a few pounds each month, which she finds concerning. She expresses satisfaction with her current weight but fears further loss. She reports a rapid sensation of fullness during meals. She underwent an EGD and colonoscopy in 11/2024, both of which yielded normal results. She was previously on Sublocade injections, with the last dose administered in 12/2024. Her weight has fluctuated between 125 and 135 since she was 30-year-old, peaking at 192 to 198 before gradually decreasing over the past 2 years. She occasionally experiences abdominal and epigastric pain. She has a family history of hypothyroidism in her mother and obesity in both parents and grandparents.    She is currently on a regimen of Nurtec 75 mg, taken every other day, which has effectively reduced the frequency of her migraines to 2 or 3 per month. This is a significant improvement from her previous state where she experienced weekly migraines, often accompanied by vomiting and loss of appetite, necessitating time off work. Without Nurtec, she suffers from severe cluster migraines characterized by light sensitivity and nausea. Her insurance covers only 16 doses of Nurtec, so she uses it sparingly.      Health Maintenance   Topic Date Due    HEPATITIS C SCREENING  Never done    COVID-19 Vaccine (2 - 2024-25 season) 09/23/2025 (Originally 9/1/2024)    ANNUAL PHYSICAL  03/27/2026    PAP SMEAR  11/01/2027    TDAP/TD VACCINES (2 - Td or Tdap) 12/08/2033     "INFLUENZA VACCINE  Completed    Pneumococcal Vaccine 0-49  Aged Out    CHLAMYDIA SCREENING  Discontinued     Pap smear- 11/2024  Influenza- 11/2024  Covid 19 - declined- counseled   Tdap- 12/2023  Monthly SBE counseled Mom hx of breast cancer janine masectomy (40)  Alcohol use- never   Tobacco use- vape disposable about a month to go through    Past Medical History:   Diagnosis Date    ADHD (attention deficit hyperactivity disorder) 2008    Anxiety 2013    Drug abuse     Migraines       Family History   Problem Relation Age of Onset    Cancer Mother         Breast cancer    Alcohol abuse Father     ADD / ADHD Brother     Colon cancer Neg Hx       Past Surgical History:   Procedure Laterality Date    COLONOSCOPY N/A 11/14/2024    Procedure: COLONOSCOPY;  Surgeon: Ronnell Polo MD;  Location: [facility] ENDOSCOPY;  Service: Gastroenterology;  Laterality: N/A;  Normal colonoscopy    ENDOSCOPY N/A 11/14/2024    Procedure: ESOPHAGOGASTRODUODENOSCOPY with biopsies;  Surgeon: Ronnell Polo MD;  Location: [facility] ENDOSCOPY;  Service: Gastroenterology;  Laterality: N/A;  hiatal hernia    WISDOM TOOTH EXTRACTION          Current Outpatient Medications:     etonogestrel-ethinyl estradiol (NUVARING) 0.12-0.015 MG/24HR vaginal ring, Insert 1 each into the vagina Every 28 (Twenty-Eight) Days. Insert vaginally and leave in place for 3 consecutive weeks, then remove for 1 week., Disp: , Rfl:     ondansetron ODT (ZOFRAN-ODT) 4 MG disintegrating tablet, Place 1 tablet on the tongue Every 8 (Eight) Hours As Needed for Nausea or Vomiting., Disp: 30 tablet, Rfl: 1    rimegepant sulfate ODT (Nurtec) 75 MG disintegrating tablet, Place 1 tablet under the tongue Every Other Day., Disp: 16 tablet, Rfl: 11    Sublocade 100 MG/0.5ML injection, INJECT 100MG SUBCUTANEOUSLY ONCE MONTHLY (Patient not taking: Reported on 3/27/2025), Disp: , Rfl:     OBJECTIVE  Vital Signs:   /73   Pulse 84   Ht 170.2 cm (67\")   Wt 61.7 kg (136 " "lb)   BMI 21.30 kg/m²    Estimated body mass index is 21.3 kg/m² as calculated from the following:    Height as of this encounter: 170.2 cm (67\").    Weight as of this encounter: 61.7 kg (136 lb).     Wt Readings from Last 3 Encounters:   03/27/25 61.7 kg (136 lb)   11/14/24 68.4 kg (150 lb 12.7 oz)   11/05/24 72.1 kg (158 lb 14.4 oz)     BP Readings from Last 3 Encounters:   03/27/25 108/73   11/14/24 109/66   11/05/24 146/82       Physical Exam  Vitals and nursing note reviewed.   Constitutional:       Appearance: Normal appearance.   HENT:      Head: Normocephalic and atraumatic.   Cardiovascular:      Rate and Rhythm: Normal rate and regular rhythm.      Heart sounds: Normal heart sounds.   Pulmonary:      Effort: Pulmonary effort is normal.      Breath sounds: Normal breath sounds.   Abdominal:      General: Abdomen is flat. Bowel sounds are normal.      Palpations: Abdomen is soft.   Musculoskeletal:         General: Normal range of motion.      Cervical back: Normal range of motion.   Skin:     General: Skin is warm and dry.   Neurological:      General: No focal deficit present.      Mental Status: She is alert and oriented to person, place, and time. Mental status is at baseline.   Psychiatric:         Mood and Affect: Mood normal.         Behavior: Behavior normal.         Thought Content: Thought content normal.         Judgment: Judgment normal.          Result Review        No Images in the past 120 days found..     The above data has been reviewed by ANDREY Acosta 03/27/2025 11:16 EDT.          Patient Care Team:  Rema Wills APRN as PCP - General (Internal Medicine)    BMI is within normal parameters. No other follow-up for BMI required.       ASSESSMENT & PLAN    Diagnoses and all orders for this visit:    1. Annual physical exam (Primary)  Assessment & Plan:  Pap smear- 11/2024  Influenza- 11/2024  Covid 19 - declined- counseled   Tdap- 12/2023  Monthly SBE counseled Mom hx of breast cancer " janine masectomy (40)  Alcohol use- never   Tobacco use- vape disposable about a month to go through      2. Intractable migraine with aura without status migrainosus  Assessment & Plan:  Stable with Nurtec 75 mg every other day.  Continue current medication regimen               Orders:  -     rimegepant sulfate ODT (Nurtec) 75 MG disintegrating tablet; Place 1 tablet under the tongue Every Other Day.  Dispense: 16 tablet; Refill: 11    3. Weight loss  Assessment & Plan:  Her BMI is within the normal range, indicating that she is not underweight. The weight loss could be attributed to her medication regimen. She is advised to consume small, frequent meals and incorporate protein shakes into her diet to aid in muscle gain and satiety. Laboratory tests will be conducted to assess T4, T3, and thyroid antibodies to determine if there is a thyroid-related cause for the weight loss.      4. Routine lab draw  -     CBC w AUTO Differential; Future  -     Comprehensive metabolic panel; Future  -     Comprehensive metabolic panel  -     CBC w AUTO Differential    5. Screening for lipid disorders  -     Lipid panel; Future  -     Lipid panel    6. Screening for thyroid disorder  -     TSH+Free T4; Future  -     Thyroid Peroxidase Antibody  -     TSH+Free T4    7. Vitamin D deficiency  -     Vitamin D 25 hydroxy; Future    8. Vitamin B12 deficiency  -     Vitamin B12 & Folate; Future    9. Encounter for hepatitis C screening test for low risk patient  -     Hepatitis C antibody; Future    10. Unintentional weight loss  -     Hemoglobin A1c; Future  -     C-Peptide; Future  -     Insulin, Free & Total, Serum; Future         Assessment & Plan  1. Weight loss.  Her BMI is within the normal range, indicating that she is not underweight. The weight loss could be attributed to her medication regimen. She is advised to consume small, frequent meals and incorporate protein shakes into her diet to aid in muscle gain and satiety.  Laboratory tests will be conducted to assess T4, T3, and thyroid antibodies to determine if there is a thyroid-related cause for the weight loss.    2. Migraines.  She reports that Nurtec 75 mg taken every other day has significantly reduced the frequency of her migraines from weekly to two or three times a month. A prescription refill for Nurtec 75 mg [facility]    3. Constipation.  She experiences constipation and uses Linzess as needed, resulting in one or two bowel movements per week. No changes to her current management plan are necessary at this time.    PROCEDURE  The patient underwent an EGD and colonoscopy in 11/2024, both of which yielded normal results.      Tobacco Use: Low Risk  (3/27/2025)    Patient History     Smoking Tobacco Use: Never     Smokeless Tobacco Use: Never     Passive Exposure: Not on file   Recent Concern: Tobacco Use - Medium Risk (3/12/2025)    Received from [facility]    Patient History     Smoking Tobacco Use: Former     Smokeless Tobacco Use: Never     Passive Exposure: Not on file       Follow Up     No follow-ups on file.    Please note that portions of this note were completed with a voice recognition program.    Patient was given instructions and counseling regarding her condition or for health maintenance advice. Please see specific information pulled into the AVS if appropriate.   I have reviewed information obtained and documented by others and I have confirmed the accuracy of this documented note.    ANDREY Acosta    Patient or patient representative verbalized consent for the use of Ambient Listening during the visit with  ANDREY Acosta for chart documentation. 3/27/2025  11:17 EDT

## 2025-03-27 NOTE — ASSESSMENT & PLAN NOTE
Her BMI is within the normal range, indicating that she is not underweight. The weight loss could be attributed to her medication regimen. She is advised to consume small, frequent meals and incorporate protein shakes into her diet to aid in muscle gain and satiety. Laboratory tests will be conducted to assess T4, T3, and thyroid antibodies to determine if there is a thyroid-related cause for the weight loss.

## 2025-03-28 LAB — THYROPEROXIDASE AB SERPL-ACNC: 12 IU/ML (ref 0–34)

## 2025-06-20 ENCOUNTER — OFFICE VISIT (OUTPATIENT)
Dept: INTERNAL MEDICINE | Age: 30
End: 2025-06-20
Payer: COMMERCIAL

## 2025-06-20 ENCOUNTER — TELEPHONE (OUTPATIENT)
Dept: INTERNAL MEDICINE | Age: 30
End: 2025-06-20

## 2025-06-20 VITALS
TEMPERATURE: 98.3 F | HEIGHT: 67 IN | SYSTOLIC BLOOD PRESSURE: 124 MMHG | OXYGEN SATURATION: 94 % | DIASTOLIC BLOOD PRESSURE: 84 MMHG | HEART RATE: 72 BPM | WEIGHT: 132 LBS | BODY MASS INDEX: 20.72 KG/M2

## 2025-06-20 DIAGNOSIS — J03.90 TONSILLITIS: ICD-10-CM

## 2025-06-20 DIAGNOSIS — J35.8 TONSIL STONE: ICD-10-CM

## 2025-06-20 DIAGNOSIS — J02.9 SORE THROAT: Primary | ICD-10-CM

## 2025-06-20 DIAGNOSIS — R19.6 HALITOSIS: ICD-10-CM

## 2025-06-20 LAB
EXPIRATION DATE: NORMAL
INTERNAL CONTROL: NORMAL
Lab: NORMAL
S PYO AG THROAT QL: NEGATIVE

## 2025-06-20 PROCEDURE — 87081 CULTURE SCREEN ONLY: CPT

## 2025-06-20 RX ORDER — FLUCONAZOLE 150 MG/1
150 TABLET ORAL ONCE
Qty: 1 TABLET | Refills: 0 | Status: SHIPPED | OUTPATIENT
Start: 2025-06-20 | End: 2025-06-20

## 2025-06-20 RX ORDER — METHYLPREDNISOLONE 4 MG/1
TABLET ORAL
Qty: 21 EACH | Refills: 0 | Status: SHIPPED | OUTPATIENT
Start: 2025-06-20 | End: 2025-06-25

## 2025-06-20 NOTE — TELEPHONE ENCOUNTER
Yes we can put in referral but I think I should see her. If she has exudate she may need antibiotics. Can she come in today

## 2025-06-20 NOTE — PROGRESS NOTES
Chief Complaint  tonsil stones (Painful, swollen, they do have puss coming from them, foul breath)    History of Present Illness  SUBJECTIVE  Areli Hernandez presents to [facility]    History of Present Illness  The patient presents for evaluation of potential a tonsil infection.    She has been experiencing persistent changes in her oral health, despite having no cavities and undergoing monthly deep cleanings. She reports daily episodes of crying due to the severity of her symptoms. Her saliva has consistently been discolored, either yellow or green. Approximately 3 to 4 months ago, she expectorated a foreign substance, which was later identified as tonsil stones. This was a new occurrence for her. She has been managing postnasal drip with allergy medication and nasal spray for the past 4 months, even though she does not have allergies. These treatments have not provided relief. A week ago, during her most recent dental cleaning, her dentist suggested a possible tonsil infection as the cause of her symptoms.     She experiences severe throat pain and swollen tonsils, which occasionally become so enlarged that they impede her ability to swallow. However, she does not experience pain during swallowing, but rather a persistent soreness in the back of her throat. She also reports mild swelling of her lymph nodes. She has been using mouthwashes and brushing her teeth frequently, but these measures have not alleviated her symptoms. She has a history of frequent strep throat infections before the age of 12, for which she received steroid injections, but her tonsils were never removed.    FAMILY HISTORY  She mentions that her family has a history of bad teeth.      Past Medical History:   Diagnosis Date    ADHD (attention deficit hyperactivity disorder) 2008    Anxiety 2013    Drug abuse     Migraines       Family History   Problem Relation Age of Onset    Cancer Mother         Breast  cancer    Alcohol abuse Father     ADD / ADHD Brother     Colon cancer Neg Hx       Past Surgical History:   Procedure Laterality Date    COLONOSCOPY N/A 11/14/2024    Procedure: COLONOSCOPY;  Surgeon: Ronnell Polo MD;  Location: [facility] ENDOSCOPY;  Service: Gastroenterology;  Laterality: N/A;  Normal colonoscopy    ENDOSCOPY N/A 11/14/2024    Procedure: ESOPHAGOGASTRODUODENOSCOPY with biopsies;  Surgeon: Ronnell Polo MD;  Location: [facility] ENDOSCOPY;  Service: Gastroenterology;  Laterality: N/A;  hiatal hernia    WISDOM TOOTH EXTRACTION          Current Outpatient Medications:     etonogestrel-ethinyl estradiol (NUVARING) 0.12-0.015 MG/24HR vaginal ring, Insert 1 each into the vagina Every 28 (Twenty-Eight) Days. Insert vaginally and leave in place for 3 consecutive weeks, then remove for 1 week., Disp: , Rfl:     ondansetron ODT (ZOFRAN-ODT) 4 MG disintegrating tablet, Place 1 tablet on the tongue Every 8 (Eight) Hours As Needed for Nausea or Vomiting., Disp: 30 tablet, Rfl: 1    rimegepant sulfate ODT (Nurtec) 75 MG disintegrating tablet, Place 1 tablet under the tongue Every Other Day., Disp: 16 tablet, Rfl: 11    amoxicillin-clavulanate (AUGMENTIN) 875-125 MG per tablet, Take 1 tablet by mouth 2 (Two) Times a Day for 10 days. Take with food., Disp: 20 tablet, Rfl: 0    fluconazole (Diflucan) 150 MG tablet, Take 1 tablet by mouth 1 (One) Time for 1 dose., Disp: 1 tablet, Rfl: 0    linaclotide (Linzess) 145 MCG capsule capsule, Take 1 capsule by mouth Every Morning Before Breakfast for 90 days. (Patient not taking: Reported on 6/20/2025), Disp: 90 capsule, Rfl: 3    methylPREDNISolone (MEDROL) 4 MG dose pack, Take as directed on package instructions., Disp: 21 each, Rfl: 0    Sublocade 100 MG/0.5ML injection, INJECT 100MG SUBCUTANEOUSLY ONCE MONTHLY (Patient not taking: Reported on 6/20/2025), Disp: , Rfl:     OBJECTIVE  Vital Signs:   /84 (BP Location: Left arm, Patient Position: Sitting,  "Cuff Size: Adult)   Pulse 72   Temp 98.3 °F (36.8 °C) (Temporal)   Ht 170.2 cm (67.01\")   Wt 59.9 kg (132 lb)   SpO2 94%   BMI 20.67 kg/m²    Estimated body mass index is 20.67 kg/m² as calculated from the following:    Height as of this encounter: 170.2 cm (67.01\").    Weight as of this encounter: 59.9 kg (132 lb).     Wt Readings from Last 3 Encounters:   06/20/25 59.9 kg (132 lb)   03/27/25 61.7 kg (136 lb)   11/14/24 68.4 kg (150 lb 12.7 oz)     BP Readings from Last 3 Encounters:   06/20/25 124/84   03/27/25 108/73   11/14/24 109/66       Physical Exam  Vitals and nursing note reviewed.   Constitutional:       Appearance: Normal appearance.   HENT:      Head: Normocephalic.      Mouth/Throat:      Pharynx: Pharyngeal swelling, oropharyngeal exudate and posterior oropharyngeal erythema present.   Eyes:      Extraocular Movements: Extraocular movements intact.      Conjunctiva/sclera: Conjunctivae normal.   Cardiovascular:      Rate and Rhythm: Normal rate and regular rhythm.      Heart sounds: Normal heart sounds. No murmur heard.  Pulmonary:      Effort: Pulmonary effort is normal.      Breath sounds: Normal breath sounds. No wheezing or rales.   Abdominal:      General: Bowel sounds are normal.      Palpations: Abdomen is soft.      Tenderness: There is no abdominal tenderness. There is no guarding.   Musculoskeletal:         General: No swelling. Normal range of motion.   Skin:     General: Skin is warm and dry.   Neurological:      General: No focal deficit present.      Mental Status: She is alert. Mental status is at baseline.   Psychiatric:         Mood and Affect: Mood normal.         Thought Content: Thought content normal.          Result Review        No Images in the past 120 days found..     The above data has been reviewed by ANDREY Acosta 06/20/2025 14:21 EDT.          Patient Care Team:  Rema Wills APRN as PCP - General (Internal Medicine)    BMI is within normal parameters. No other " follow-up for BMI required.       ASSESSMENT & PLAN    Diagnoses and all orders for this visit:    1. Sore throat (Primary)  -     POCT rapid strep A  -     Beta Strep Culture, Throat - Swab, Throat; Future  -     Beta Strep Culture, Throat - Swab, Throat  -     Ambulatory Referral to ENT (Otolaryngology)    2. Tonsil stone    3. Tonsillitis  -     amoxicillin-clavulanate (AUGMENTIN) 875-125 MG per tablet; Take 1 tablet by mouth 2 (Two) Times a Day for 10 days. Take with food.  Dispense: 20 tablet; Refill: 0  -     methylPREDNISolone (MEDROL) 4 MG dose pack; Take as directed on package instructions.  Dispense: 21 each; Refill: 0    4. Halitosis  -     Ambulatory Referral to ENT (Otolaryngology)    Other orders  -     fluconazole (Diflucan) 150 MG tablet; Take 1 tablet by mouth 1 (One) Time for 1 dose.  Dispense: 1 tablet; Refill: 0         Assessment & Plan  1. Tonsil infection.  - Reports sore throat, swollen tonsils, and postnasal drip persisting for several months.  - Examination reveals erythematous throat and swollen tonsils.  - A course of Augmentin and steroids will be initiated. Advised to take Augmentin with food to minimize gastrointestinal side effects.  - Referral to an ENT specialist will be made for further evaluation. A throat culture will be obtained to confirm the diagnosis.  - Recommend water pic as well       Tobacco Use: Low Risk  (6/20/2025)    Patient History     Smoking Tobacco Use: Never     Smokeless Tobacco Use: Never     Passive Exposure: Never       Follow Up     Return if symptoms worsen or fail to improve.    Please note that portions of this note were completed with a voice recognition program.    Patient was given instructions and counseling regarding her condition or for health maintenance advice. Please see specific information pulled into the AVS if appropriate.   I have reviewed information obtained and documented by others and I have confirmed the accuracy of this documented  note.    ANDREY Acosta    Patient or patient representative verbalized consent for the use of Ambient Listening during the visit with  ANDREY Acosta for chart documentation. 6/20/2025  14:26 EDT

## 2025-06-20 NOTE — TELEPHONE ENCOUNTER
Spoke w/ pt, agreeable to be seen today. Pt will be at work until 12.    Scheduled visit @ 1:45 PM, is this okay?

## 2025-06-20 NOTE — TELEPHONE ENCOUNTER
Caller: Areli Hernandez    Relationship: Self    Best call back number: [phone]    What is the medical concern/diagnosis: TONSIL STONES, FLUID COMING OUT, SWELLING, AND A HARD TIME EATING OR DRINKING ANYTHING     What specialty or service is being requested: ENT    Any additional details: PATIENT HAS BEEN SEEING THE DENTIST AND THEY FOUND ISSUES WITH TONSILS, SUCH AS TONSIL STONES, FLUID COMING OUT, SWELLING, AND PATIENT IS HAVING A HARD TIME EATING OR DRINKING. THEY SUGGESTED THAT SHE SEE AN ENT AS IT HAS BEEN GOING ON FOR 6 MONTHS AND JUST KEEPS GETTING WORSE.     PATIENT WOULD LIKE CALL WITH REFERRAL DETAILS.    Tachycardia

## 2025-06-22 LAB — BACTERIA SPEC AEROBE CULT: NORMAL

## (undated) DEVICE — SINGLE-USE BIOPSY FORCEPS: Brand: RADIAL JAW 4

## (undated) DEVICE — SOL IRRG H2O PL/BG 1000ML STRL

## (undated) DEVICE — SOLIDIFIER LIQLOC PLS 1500CC BT

## (undated) DEVICE — CONN JET HYDRA H20 AUXILIARY DISP

## (undated) DEVICE — Device

## (undated) DEVICE — Device: Brand: DEFENDO AIR/WATER/SUCTION AND BIOPSY VALVE

## (undated) DEVICE — LINER SURG CANSTR SXN S/RIGD 1500CC

## (undated) DEVICE — BLCK/BITE BLOX WO/DENTL/RIM W/STRAP 54F